# Patient Record
Sex: MALE | Race: WHITE | HISPANIC OR LATINO | Employment: OTHER | URBAN - METROPOLITAN AREA
[De-identification: names, ages, dates, MRNs, and addresses within clinical notes are randomized per-mention and may not be internally consistent; named-entity substitution may affect disease eponyms.]

---

## 2018-06-12 ENCOUNTER — OFFICE VISIT (OUTPATIENT)
Dept: UROLOGY | Facility: CLINIC | Age: 78
End: 2018-06-12
Payer: COMMERCIAL

## 2018-06-12 VITALS
HEART RATE: 55 BPM | DIASTOLIC BLOOD PRESSURE: 70 MMHG | TEMPERATURE: 97.8 F | RESPIRATION RATE: 18 BRPM | SYSTOLIC BLOOD PRESSURE: 114 MMHG

## 2018-06-12 DIAGNOSIS — R35.1 NOCTURIA: Primary | ICD-10-CM

## 2018-06-12 LAB
ALBUMIN UR-MCNC: NEGATIVE MG/DL
APPEARANCE UR: CLEAR
BILIRUB UR QL STRIP: NEGATIVE
COLOR UR AUTO: YELLOW
GLUCOSE UR STRIP-MCNC: NEGATIVE MG/DL
HGB UR QL STRIP: NEGATIVE
KETONES UR STRIP-MCNC: NEGATIVE MG/DL
LEUKOCYTE ESTERASE UR QL STRIP: NEGATIVE
NITRATE UR QL: NEGATIVE
PH UR STRIP: 5.5 PH (ref 5–7)
RBC #/AREA URNS AUTO: ABNORMAL /HPF
SOURCE: ABNORMAL
SP GR UR STRIP: 1.02 (ref 1–1.03)
URATE CRY #/AREA URNS HPF: ABNORMAL /HPF
UROBILINOGEN UR STRIP-ACNC: 0.2 EU/DL (ref 0.2–1)
WBC #/AREA URNS AUTO: ABNORMAL /HPF

## 2018-06-12 PROCEDURE — 51798 US URINE CAPACITY MEASURE: CPT | Performed by: UROLOGY

## 2018-06-12 PROCEDURE — 99203 OFFICE O/P NEW LOW 30 MIN: CPT | Mod: 25 | Performed by: UROLOGY

## 2018-06-12 PROCEDURE — 87086 URINE CULTURE/COLONY COUNT: CPT | Performed by: UROLOGY

## 2018-06-12 PROCEDURE — 81001 URINALYSIS AUTO W/SCOPE: CPT | Performed by: UROLOGY

## 2018-06-12 RX ORDER — ROSUVASTATIN CALCIUM 10 MG/1
10 TABLET, COATED ORAL AT BEDTIME
COMMUNITY
Start: 2018-05-31

## 2018-06-12 RX ORDER — LISINOPRIL 10 MG/1
10 TABLET ORAL DAILY
Status: ON HOLD | COMMUNITY
Start: 2018-05-31 | End: 2021-08-31

## 2018-06-12 RX ORDER — ASPIRIN 325 MG
325 TABLET ORAL DAILY
COMMUNITY
Start: 2010-11-17

## 2018-06-12 RX ORDER — OXYBUTYNIN CHLORIDE 5 MG/1
5 TABLET ORAL 3 TIMES DAILY
Qty: 30 TABLET | Refills: 3 | Status: SHIPPED | OUTPATIENT
Start: 2018-06-12 | End: 2019-03-01

## 2018-06-12 RX ORDER — SPIRONOLACTONE 25 MG
20 TABLET ORAL 2 TIMES DAILY
COMMUNITY
Start: 2014-01-17

## 2018-06-12 RX ORDER — FINASTERIDE 5 MG/1
5 TABLET, FILM COATED ORAL DAILY
COMMUNITY
Start: 2018-05-14

## 2018-06-12 RX ORDER — CHLORAL HYDRATE 500 MG
2 CAPSULE ORAL DAILY
COMMUNITY
Start: 2013-06-24 | End: 2020-08-28

## 2018-06-12 RX ORDER — TAMSULOSIN HYDROCHLORIDE 0.4 MG/1
0.4 CAPSULE ORAL DAILY
COMMUNITY
Start: 2018-05-31

## 2018-06-12 RX ORDER — VITAMIN E 268 MG
400 CAPSULE ORAL DAILY
COMMUNITY
Start: 2014-01-17 | End: 2020-08-28

## 2018-06-12 RX ORDER — AMLODIPINE BESYLATE 5 MG/1
5 TABLET ORAL DAILY
Status: ON HOLD | COMMUNITY
Start: 2018-05-31 | End: 2021-08-31

## 2018-06-12 RX ORDER — SILDENAFIL 100 MG/1
100 TABLET, FILM COATED ORAL PRN
COMMUNITY
Start: 2018-05-31 | End: 2020-08-28

## 2018-06-12 NOTE — NURSING NOTE
Chief Complaint   Patient presents with     Consult     Transfer Care, Nocturia, currently prescribed finasteride, tamsulosin, and oxybutynin     Elevated PSA     History of Elevated PSA with benign biopsy        Initial /70 (BP Location: Right arm, Patient Position: Chair, Cuff Size: Adult Regular)  Pulse 55  Temp 97.8  F (36.6  C) (Tympanic)  Resp 18 There is no height or weight on file to calculate BMI.  BP completed using cuff size: regular  Medications and allergies reviewed.      Cecy PANDEY, CMA

## 2018-06-12 NOTE — NURSING NOTE
Active order to obtain bladder scan? Yes   Name of ordering provider:  Claudia   Bladder scan preformed post void Yes.  Bladder scan reveled 20ML  Provider notified?  Yes    Cecy PANDEY CMA

## 2018-06-12 NOTE — MR AVS SNAPSHOT
After Visit Summary   6/12/2018    Roberto Carlos Arce    MRN: [id number]           Patient Information     Date Of Birth          1940        Visit Information        Provider Department      6/12/2018 1:00 PM SANA Taylor MD [facility]        Today's Diagnoses     Nocturia    -  1      Care Instructions    Per Physician's instructions            Follow-ups after your visit        Who to contact     If you have questions or need follow up information about today's clinic visit or your schedule please contact [facility] directly at [phone].  Normal or non-critical lab and imaging results will be communicated to you by MyChart, letter or phone within 4 business days after the clinic has received the results. If you do not hear from us within 7 days, please contact the clinic through MyChart or phone. If you have a critical or abnormal lab result, we will notify you by phone as soon as possible.  Submit refill requests through [employer] or call your pharmacy and they will forward the refill request to us. Please allow 3 business days for your refill to be completed.          Additional Information About Your Visit        Care EveryWhere ID     This is your Care EveryWhere ID. This could be used by other organizations to access your [facility] medical records  [id number]        Your Vitals Were     Pulse Temperature Respirations             55 97.8  F (36.6  C) (Tympanic) 18          Blood Pressure from Last 3 Encounters:   06/12/18 114/70   12/23/16 117/80    Weight from Last 3 Encounters:   No data found for Wt              We Performed the Following     MEASURE POST-VOID RESIDUAL URINE/BLADDER CAPACITY, US NON-IMAGING     UA with Microscopic     Urine Culture Aerobic Bacterial          Today's Medication Changes          These changes are accurate as of 6/12/18  3:11 PM.  If you have any questions, ask your nurse or doctor.               Start taking these  medicines.        Dose/Directions    oxybutynin 5 MG tablet   Commonly known as:  DITROPAN   Used for:  Nocturia   Started by:  SANA Taylor MD        Dose:  5 mg   Take 1 tablet (5 mg) by mouth 3 times daily   Quantity:  30 tablet   Refills:  3            Where to get your medicines      These medications were sent to [id number] - [place], [place] - [address]  [address] [address]     Phone:  [phone]     oxybutynin 5 MG tablet                Primary Care Provider Office Phone # Fax #    Amirah LauraKYLAH [phone]       [facility] [address] [place] [address]        Equal Access to Services     MIKAYLA MEDEROS : Hadii jose cosby hadasho Soomaali, waaxda luqadaha, qaybta kaalmada adeegyada, latisha león. So [facility] [phone].    ATENCIÓN: Si habla español, tiene a barrera disposición servicios gratuitos de asistencia lingüística. Llame al [phone].    We comply with applicable federal civil rights laws and [place] laws. We do not discriminate on the basis of race, color, national origin, age, disability, sex, sexual orientation, or gender identity.            Thank you!     Thank you for choosing [facility]  for your care. Our goal is always to provide you with excellent care. Hearing back from our patients is one way we can continue to improve our services. Please take a few minutes to complete the written survey that you may receive in the mail after your visit with us. Thank you!             Your Updated Medication List - Protect others around you: Learn how to safely use, store and throw away your medicines at www.disposemymeds.org.          This list is accurate as of 6/12/18  3:11 PM.  Always use your most recent med list.                   Brand Name Dispense Instructions for use Diagnosis    amLODIPine 5 MG tablet    NORVASC     Take 5 mg by mouth daily        aspirin 325 MG  tablet      Take 325 mg by mouth daily        finasteride 5 MG tablet    PROSCAR     Take 5 mg by mouth daily        fish oil-omega-3 fatty acids 1000 MG capsule      Take 2 capsules by mouth daily        lisinopril 10 MG tablet    PRINIVIL/ZESTRIL     Take 10 mg by mouth daily        Lutein 20 MG Caps      Take 20 mg by mouth daily        MULTIVITAMIN ADULT PO      Take 1 tablet by mouth daily        oxybutynin 5 MG tablet    DITROPAN    30 tablet    Take 1 tablet (5 mg) by mouth 3 times daily    Nocturia       rosuvastatin 10 MG tablet    CRESTOR     Take 10 mg by mouth At Bedtime        sildenafil 100 MG tablet    VIAGRA     100 mg by Oral or Feeding Tube route as needed        tamsulosin 0.4 MG capsule    FLOMAX     Take 0.4 mg by mouth daily        vitamin D3 1000 units Caps      Take 2,000 Units by mouth daily        vitamin E 400 UNIT capsule      Take 400 Units by mouth daily

## 2018-06-12 NOTE — PROGRESS NOTES
Appointment source: New Patient  Patient name: Roberto Carlos Arce  Urology Staff: Baljeet Taylor MD    Subjective: This is a 77 year old year old male with a history of mild symptoms of bladder outlet obstruction.    Objective:  Has been managed with finasteride and tamsulosin but still having some nocturia.    Possible childhood history of enuresis.    Sleep disturbance has not been evaluated for non urological issues.    Post void residual 20 cc    Examination:    Abdomen benign  External genitalia normal  Rectal normal  Prostate benign    Assessment:  Nocturia despite tamsulosin and finasteride.    Plan:  Will add oxybutynin to his voiding regimen as there may be a neurogenic component to his voiding dysfunction.    Total time 30 minutes, counseling 20 minutes discussing nocturia

## 2018-06-13 LAB
BACTERIA SPEC CULT: NO GROWTH
Lab: NORMAL
SPECIMEN SOURCE: NORMAL

## 2019-03-01 DIAGNOSIS — R35.1 NOCTURIA: ICD-10-CM

## 2019-03-01 RX ORDER — OXYBUTYNIN CHLORIDE 5 MG/1
5 TABLET ORAL 3 TIMES DAILY
Qty: 30 TABLET | Refills: 3 | Status: SHIPPED | OUTPATIENT
Start: 2019-03-01 | End: 2020-11-17

## 2019-03-01 NOTE — TELEPHONE ENCOUNTER
Reason for Call:  Medication or medication refill:    Do you use a [facility]?  Name of the pharmacy and phone number for the current request:  [facility] [phone]    Name of the medication requested: Oxybutynin    Other request:   LAST REFILL: 02/21/2019  LOV: 06/12/2018    Can we leave a detailed message on this number? Not Applicable    Phone number patient can be reached at: Home number on file [phone] (home)    Best Time: NA    Call taken on 3/1/2019 at 7:32 AM by Denise Behrendt

## 2020-08-28 ENCOUNTER — HOSPITAL ENCOUNTER (EMERGENCY)
Facility: CLINIC | Age: 80
Discharge: HOME | End: 2020-08-28
Attending: EMERGENCY MEDICINE | Admitting: EMERGENCY MEDICINE
Payer: COMMERCIAL

## 2020-08-28 ENCOUNTER — APPOINTMENT (OUTPATIENT)
Dept: CT IMAGING | Facility: CLINIC | Age: 80
End: 2020-08-28
Attending: EMERGENCY MEDICINE
Payer: COMMERCIAL

## 2020-08-28 ENCOUNTER — APPOINTMENT (OUTPATIENT)
Dept: MRI IMAGING | Facility: CLINIC | Age: 80
End: 2020-08-28
Attending: EMERGENCY MEDICINE
Payer: COMMERCIAL

## 2020-08-28 VITALS
SYSTOLIC BLOOD PRESSURE: 133 MMHG | BODY MASS INDEX: 24.41 KG/M2 | WEIGHT: 143 LBS | OXYGEN SATURATION: 99 % | TEMPERATURE: 96.9 F | HEIGHT: 64 IN | RESPIRATION RATE: 16 BRPM | HEART RATE: 54 BPM | DIASTOLIC BLOOD PRESSURE: 72 MMHG

## 2020-08-28 DIAGNOSIS — R47.1 DYSARTHRIA: ICD-10-CM

## 2020-08-28 DIAGNOSIS — G45.9 TIA (TRANSIENT ISCHEMIC ATTACK): ICD-10-CM

## 2020-08-28 LAB
ALBUMIN SERPL-MCNC: 4.1 G/DL (ref 3.4–5)
ALP SERPL-CCNC: 48 U/L (ref 40–150)
ALT SERPL W P-5'-P-CCNC: 30 U/L (ref 0–70)
ANION GAP SERPL CALCULATED.3IONS-SCNC: 2 MMOL/L (ref 3–14)
ANION GAP SERPL CALCULATED.3IONS-SCNC: 4 MMOL/L (ref 3–14)
APTT PPP: 30 SEC (ref 22–37)
AST SERPL W P-5'-P-CCNC: 20 U/L (ref 0–45)
BASOPHILS # BLD AUTO: 0.1 10E9/L (ref 0–0.2)
BASOPHILS NFR BLD AUTO: 0.6 %
BILIRUB SERPL-MCNC: 1 MG/DL (ref 0.2–1.3)
BUN SERPL-MCNC: 23 MG/DL (ref 7–30)
BUN SERPL-MCNC: 24 MG/DL (ref 7–30)
CALCIUM SERPL-MCNC: 9.3 MG/DL (ref 8.5–10.1)
CALCIUM SERPL-MCNC: 9.7 MG/DL (ref 8.5–10.1)
CHLORIDE SERPL-SCNC: 107 MMOL/L (ref 94–109)
CHLORIDE SERPL-SCNC: 107 MMOL/L (ref 94–109)
CO2 SERPL-SCNC: 29 MMOL/L (ref 20–32)
CO2 SERPL-SCNC: 29 MMOL/L (ref 20–32)
CREAT SERPL-MCNC: 0.96 MG/DL (ref 0.66–1.25)
CREAT SERPL-MCNC: 0.98 MG/DL (ref 0.66–1.25)
DIFFERENTIAL METHOD BLD: NORMAL
EOSINOPHIL # BLD AUTO: 0.2 10E9/L (ref 0–0.7)
EOSINOPHIL NFR BLD AUTO: 2.1 %
ERYTHROCYTE [DISTWIDTH] IN BLOOD BY AUTOMATED COUNT: 12.6 % (ref 10–15)
GFR SERPL CREATININE-BSD FRML MDRD: 72 ML/MIN/{1.73_M2}
GFR SERPL CREATININE-BSD FRML MDRD: 74 ML/MIN/{1.73_M2}
GLUCOSE BLDC GLUCOMTR-MCNC: 120 MG/DL (ref 70–99)
GLUCOSE SERPL-MCNC: 106 MG/DL (ref 70–99)
GLUCOSE SERPL-MCNC: 107 MG/DL (ref 70–99)
HCT VFR BLD AUTO: 45.3 % (ref 40–53)
HGB BLD-MCNC: 15 G/DL (ref 13.3–17.7)
IMM GRANULOCYTES # BLD: 0 10E9/L (ref 0–0.4)
IMM GRANULOCYTES NFR BLD: 0.5 %
INR PPP: 1.27 (ref 0.86–1.14)
LYMPHOCYTES # BLD AUTO: 1 10E9/L (ref 0.8–5.3)
LYMPHOCYTES NFR BLD AUTO: 12.4 %
MCH RBC QN AUTO: 31.3 PG (ref 26.5–33)
MCHC RBC AUTO-ENTMCNC: 33.1 G/DL (ref 31.5–36.5)
MCV RBC AUTO: 94 FL (ref 78–100)
MONOCYTES # BLD AUTO: 0.7 10E9/L (ref 0–1.3)
MONOCYTES NFR BLD AUTO: 8.3 %
NEUTROPHILS # BLD AUTO: 6.1 10E9/L (ref 1.6–8.3)
NEUTROPHILS NFR BLD AUTO: 76.1 %
NRBC # BLD AUTO: 0 10*3/UL
NRBC BLD AUTO-RTO: 0 /100
PLATELET # BLD AUTO: 171 10E9/L (ref 150–450)
POTASSIUM SERPL-SCNC: 4.6 MMOL/L (ref 3.4–5.3)
POTASSIUM SERPL-SCNC: 4.6 MMOL/L (ref 3.4–5.3)
PROT SERPL-MCNC: 7 G/DL (ref 6.8–8.8)
RBC # BLD AUTO: 4.8 10E12/L (ref 4.4–5.9)
SODIUM SERPL-SCNC: 138 MMOL/L (ref 133–144)
SODIUM SERPL-SCNC: 140 MMOL/L (ref 133–144)
TROPONIN I SERPL-MCNC: <0.015 UG/L (ref 0–0.04)
WBC # BLD AUTO: 8 10E9/L (ref 4–11)

## 2020-08-28 PROCEDURE — 25000125 ZZHC RX 250: Performed by: EMERGENCY MEDICINE

## 2020-08-28 PROCEDURE — 99285 EMERGENCY DEPT VISIT HI MDM: CPT | Mod: 25 | Performed by: EMERGENCY MEDICINE

## 2020-08-28 PROCEDURE — A9585 GADOBUTROL INJECTION: HCPCS | Performed by: EMERGENCY MEDICINE

## 2020-08-28 PROCEDURE — 80053 COMPREHEN METABOLIC PANEL: CPT | Performed by: EMERGENCY MEDICINE

## 2020-08-28 PROCEDURE — 84484 ASSAY OF TROPONIN QUANT: CPT | Performed by: EMERGENCY MEDICINE

## 2020-08-28 PROCEDURE — 85730 THROMBOPLASTIN TIME PARTIAL: CPT | Performed by: EMERGENCY MEDICINE

## 2020-08-28 PROCEDURE — 85610 PROTHROMBIN TIME: CPT | Performed by: EMERGENCY MEDICINE

## 2020-08-28 PROCEDURE — 85025 COMPLETE CBC W/AUTO DIFF WBC: CPT | Performed by: EMERGENCY MEDICINE

## 2020-08-28 PROCEDURE — 25800030 ZZH RX IP 258 OP 636: Performed by: EMERGENCY MEDICINE

## 2020-08-28 PROCEDURE — 80048 BASIC METABOLIC PNL TOTAL CA: CPT | Performed by: EMERGENCY MEDICINE

## 2020-08-28 PROCEDURE — 93010 ELECTROCARDIOGRAM REPORT: CPT | Mod: Z6 | Performed by: EMERGENCY MEDICINE

## 2020-08-28 PROCEDURE — 25000128 H RX IP 250 OP 636: Performed by: EMERGENCY MEDICINE

## 2020-08-28 PROCEDURE — 93005 ELECTROCARDIOGRAM TRACING: CPT | Performed by: EMERGENCY MEDICINE

## 2020-08-28 PROCEDURE — 96361 HYDRATE IV INFUSION ADD-ON: CPT | Performed by: EMERGENCY MEDICINE

## 2020-08-28 PROCEDURE — 96360 HYDRATION IV INFUSION INIT: CPT | Mod: 59 | Performed by: EMERGENCY MEDICINE

## 2020-08-28 PROCEDURE — 00000146 ZZHCL STATISTIC GLUCOSE BY METER IP

## 2020-08-28 PROCEDURE — 70496 CT ANGIOGRAPHY HEAD: CPT

## 2020-08-28 PROCEDURE — 25500064 ZZH RX 255 OP 636: Performed by: EMERGENCY MEDICINE

## 2020-08-28 PROCEDURE — 70450 CT HEAD/BRAIN W/O DYE: CPT

## 2020-08-28 PROCEDURE — 70553 MRI BRAIN STEM W/O & W/DYE: CPT

## 2020-08-28 RX ORDER — CHLORPROMAZINE HYDROCHLORIDE 25 MG/1
25 TABLET, FILM COATED ORAL 3 TIMES DAILY PRN
COMMUNITY
Start: 2016-08-02

## 2020-08-28 RX ORDER — IOPAMIDOL 755 MG/ML
70 INJECTION, SOLUTION INTRAVASCULAR ONCE
Status: COMPLETED | OUTPATIENT
Start: 2020-08-28 | End: 2020-08-28

## 2020-08-28 RX ORDER — GADOBUTROL 604.72 MG/ML
6 INJECTION INTRAVENOUS ONCE
Status: COMPLETED | OUTPATIENT
Start: 2020-08-28 | End: 2020-08-28

## 2020-08-28 RX ORDER — LEVOTHYROXINE SODIUM 88 UG/1
88 TABLET ORAL DAILY
COMMUNITY

## 2020-08-28 RX ADMIN — SODIUM CHLORIDE 100 ML: 9 INJECTION, SOLUTION INTRAVENOUS at 13:47

## 2020-08-28 RX ADMIN — GADOBUTROL 6 ML: 604.72 INJECTION INTRAVENOUS at 16:37

## 2020-08-28 RX ADMIN — SODIUM CHLORIDE 500 ML: 9 INJECTION, SOLUTION INTRAVENOUS at 13:28

## 2020-08-28 RX ADMIN — IOPAMIDOL 70 ML: 755 INJECTION, SOLUTION INTRAVENOUS at 13:48

## 2020-08-28 ASSESSMENT — ENCOUNTER SYMPTOMS
BACK PAIN: 0
HEADACHES: 0
SHORTNESS OF BREATH: 0
SPEECH DIFFICULTY: 1
ABDOMINAL PAIN: 0
DYSURIA: 0
NECK PAIN: 0
COUGH: 0
WEAKNESS: 0
LIGHT-HEADEDNESS: 0
PHOTOPHOBIA: 0
FACIAL ASYMMETRY: 0
CHILLS: 0
SORE THROAT: 0
NAUSEA: 0
PALPITATIONS: 0
FEVER: 0
VOMITING: 0
DIARRHEA: 0

## 2020-08-28 ASSESSMENT — VISUAL ACUITY: OU: NORMAL ACUITY

## 2020-08-28 ASSESSMENT — MIFFLIN-ST. JEOR: SCORE: 1259.64

## 2020-08-28 NOTE — ED NOTES
On Tuesday slurred speech came and go per wife. Has had a prior hemorrhagic event in 2016. Denies any headache, nausea, fever, aches or chills. Comes in today for slurred speech episodes continuing.

## 2020-08-28 NOTE — ED PROVIDER NOTES
History     Chief Complaint   Patient presents with     Slurred Speech     word find issues today, slur to speech past couple days,      HPI  Roberto Carlos Arce is a 80 year old male with history of hypertension, carotid stenosis, hyperlipidemia, hypothyroidism, BPH, intracranial hemorrhage (2016-right frontal intraparenchymal hemorrhage), who presents for evaluation of slurred speech.  Patient wife reports that his symptoms began 3 days ago and have been intermittent.  Symptoms worse in the morning and seemed to clear up by noon.  Symptoms seem to be worse today concerning wife about possible stroke and she insisted they come to emergency department for evaluation.  Patient does not have any other symptoms including headache, dizziness, loss of consciousness, chest pain, shortness of breath, difficulty swallowing, vision changes, or unsteady gait.  Had a prior intracranial hemorrhage in 2016.  Has not had similar symptoms before.  Is not a smoker, and does not drink alcohol.  Recently started the chlorpromazine as needed for hiccups.     Patient has medication list which includes chlorpromazine 25 mg as needed, Tamsulosin 4 mg daily, levothyroxine 88 daily, lovastatin 10 mg daily, lisinopril 10 mg daily, oxybutynin 5 mgmcg, finasteride 5 mg daily, amlodipine 5 mg daily, 325 mg aspirin daily    The patient's PMHx, Surgical Hx, Allergies, and Medications were all reviewed with the patient.    Allergies:  No Known Allergies    Problem List:    There are no active problems to display for this patient.       Past Medical History:    No past medical history on file.    Past Surgical History:    No past surgical history on file.    Family History:    No family history on file.    Social History:  Marital Status:   [2]  Social History     Tobacco Use     Smoking status: Never Smoker     Smokeless tobacco: Never Used   Substance Use Topics     Alcohol use: No     Drug use: No        Medications:    amLODIPine  "(NORVASC) 5 MG tablet  aspirin 325 MG tablet  Chelated Potassium 99 MG TABS  chlorproMAZINE (THORAZINE) 25 MG tablet  Cholecalciferol (VITAMIN D3) 1000 units CAPS  finasteride (PROSCAR) 5 MG tablet  levothyroxine (SYNTHROID/LEVOTHROID) 88 MCG tablet  lisinopril (PRINIVIL/ZESTRIL) 10 MG tablet  Lutein 20 MG CAPS  oxybutynin (DITROPAN) 5 MG tablet  rosuvastatin (CRESTOR) 10 MG tablet  tamsulosin (FLOMAX) 0.4 MG capsule          Review of Systems   Constitutional: Negative for chills and fever.   HENT: Negative for congestion and sore throat.    Eyes: Negative for photophobia and visual disturbance.   Respiratory: Negative for cough and shortness of breath.    Cardiovascular: Negative for chest pain and palpitations.   Gastrointestinal: Negative for abdominal pain, diarrhea, nausea and vomiting.   Genitourinary: Negative for dysuria.   Musculoskeletal: Negative for back pain and neck pain.   Skin: Negative for rash.   Allergic/Immunologic: Negative for immunocompromised state.   Neurological: Positive for speech difficulty. Negative for syncope, facial asymmetry, weakness, light-headedness and headaches.       Physical Exam   BP: 124/75  Pulse: 60  Temp: 96.9  F (36.1  C)  Resp: 16  Height: 162.6 cm (5' 4\")  Weight: 64.9 kg (143 lb)  SpO2: 98 %    Physical Exam  General: Awake, alert, and cooperative. No acute distress  Mental Status: Oriented x 3. Speech normal.  Head: Normocephalic, atraumatic, symmetric.   Eyes: Conjunctiva normal, no discharge, PERRL 2 mm. No neglect/hemianopsia.  Visual fields intact to confrontation.  No RAPD.  Ears, Nose, Throat: External ears and nares normal.  No oral exudates. Oral mucosa.  Neck: Supple. No adenopathy. Non-tender.   Cardiovascular: Regular rate. Regular rhythm. No murmurs. Peripheral pulses strong. Normal capillary refill. No LE edema.   Respiratory: Normal effort. No wheezes, rales, or rhonchi bilaterally.  Chest Wall: Equal rise.  Abdomen: No tenderess, soft, non-distended. " No rebound or guarding. No masses palpated  Musculoskeletal: No gross deformity. Warm and well perfused  Neurologic: Mental status: Alert, awake. Oriented to self, date, and place. Normal speech and language. GCS 15  Cranial Nerves: II-XII  intact  Motor: Follows commands x 4 extremities. Down going Babinski's. No Clonus.   Upper Extremities:   RUE: 5/5 shoulder abduction. 5/5 elbow flex/ext. 5/5 wrist flex/ext. 5/5 hand .   LUE: 5/5 shoulder abduction. 5/5 elbow flex/ext. 5/5 wrist flex/ext. 5/5 hand .   Lower Extremities:   RLE: 5/5 hip flexion. 5/5 knee flex/ext. 5/5 ankle plantar-/dorsiflexion. 5/5 EHL.   LLE: 5/5 hip flexion. 5/5 knee flex/ext. 5/5 ankle plantar-/dorsiflexion. 5/5 EHL   Sensory: Sensation intact to light touch in all 4 extremities   Coordination: Finger-nose finger intact. Heel-shin intact.  Rapid alternating hands intact.  Skin: Warm, dry, no pallor, no erythema, no jaundice or rash.  Psychiatric:  Appropriate affect. Behavior is normal.    ED Course     ED Course as of Aug 28 1841   Fri Aug 28, 2020   1332 Spoke with  Dr. Mendez with stroke neurology. Recommends admission and MR.        Procedures             EKG Interpretation:      Interpreted by Roberto Carlos Epps MD  Time reviewed: 1330  Symptoms at time of EKG: None   Rhythm: sinus bradycardia  Rate: 55  Axis: Left Axis Deviation  Ectopy: none  Conduction: normal  ST Segments/ T Waves: No acute ischemic changes. TWI III  Q Waves: none  Comparison to prior: No old EKG available    Clinical Impression: sinus bradycardia. TWI III               Critical Care time:  was 50 minutes for this patient excluding procedures.     The patient has stroke symptoms:         ED Stroke specific documentation           NIHSS PDF     Patient last known well time: 3 days ago  ED Provider first to bedside at: 1310  CT Results received at: N/A (no code stroke)    tPA:   Not given due to being well outside treatment window, NIHSS 0, and prior  intraparenchymal hemorrhage.      If treating with tPA: Ensure SBP<185 and DBP<105 prior to treatment with IV tPA.  Administering IV tPA after treatment with IV labetalol, hydralazine, or nicardipine is reasonable once BP control is established.    Endovascular Retrieval:  Not initiated due to absence of proximal vessel occlusion    National Institutes of Health Stroke Scale (Baseline)  Time Performed: 1310     Score    Level of consciousness: (0)   Alert, keenly responsive    LOC questions: (0)   Answers both questions correctly    LOC commands: (0)   Performs both tasks correctly    Best gaze: (0)   Normal    Visual: (0)   No visual loss    Facial palsy: (0)   Normal symmetrical movements    Motor arm (left): (0)   No drift    Motor arm (right): (0)   No drift    Motor leg (left): (0)   No drift    Motor leg (right): (0)   No drift    Limb ataxia: (0)   Absent    Sensory: (0)   Normal- no sensory loss    Best language: (0)   Normal- no aphasia    Dysarthria: (0)   Normal    Extinction and inattention: (0)   No abnormality        Total Score:  0        Stroke Mimics were considered (including migraine headache, seizure disorder, hypoglycemia (or hyperglycemia), head or spinal trauma, CNS infection, Toxin ingestion and shock state (e.g. sepsis) .                     Results for orders placed or performed during the hospital encounter of 08/28/20 (from the past 24 hour(s))   CBC with platelets differential   Result Value Ref Range    WBC 8.0 4.0 - 11.0 10e9/L    RBC Count 4.80 4.4 - 5.9 10e12/L    Hemoglobin 15.0 13.3 - 17.7 g/dL    Hematocrit 45.3 40.0 - 53.0 %    MCV 94 78 - 100 fl    MCH 31.3 26.5 - 33.0 pg    MCHC 33.1 31.5 - 36.5 g/dL    RDW 12.6 10.0 - 15.0 %    Platelet Count 171 150 - 450 10e9/L    Diff Method Automated Method     % Neutrophils 76.1 %    % Lymphocytes 12.4 %    % Monocytes 8.3 %    % Eosinophils 2.1 %    % Basophils 0.6 %    % Immature Granulocytes 0.5 %    Nucleated RBCs 0 0 /100    Absolute  Neutrophil 6.1 1.6 - 8.3 10e9/L    Absolute Lymphocytes 1.0 0.8 - 5.3 10e9/L    Absolute Monocytes 0.7 0.0 - 1.3 10e9/L    Absolute Eosinophils 0.2 0.0 - 0.7 10e9/L    Absolute Basophils 0.1 0.0 - 0.2 10e9/L    Abs Immature Granulocytes 0.0 0 - 0.4 10e9/L    Absolute Nucleated RBC 0.0    Comprehensive metabolic panel   Result Value Ref Range    Sodium 140 133 - 144 mmol/L    Potassium 4.6 3.4 - 5.3 mmol/L    Chloride 107 94 - 109 mmol/L    Carbon Dioxide 29 20 - 32 mmol/L    Anion Gap 4 3 - 14 mmol/L    Glucose 107 (H) 70 - 99 mg/dL    Urea Nitrogen 23 7 - 30 mg/dL    Creatinine 0.96 0.66 - 1.25 mg/dL    GFR Estimate 74 >60 mL/min/[1.73_m2]    GFR Estimate If Black 85 >60 mL/min/[1.73_m2]    Calcium 9.7 8.5 - 10.1 mg/dL    Bilirubin Total 1.0 0.2 - 1.3 mg/dL    Albumin 4.1 3.4 - 5.0 g/dL    Protein Total 7.0 6.8 - 8.8 g/dL    Alkaline Phosphatase 48 40 - 150 U/L    ALT 30 0 - 70 U/L    AST 20 0 - 45 U/L   Basic metabolic panel   Result Value Ref Range    Sodium 138 133 - 144 mmol/L    Potassium 4.6 3.4 - 5.3 mmol/L    Chloride 107 94 - 109 mmol/L    Carbon Dioxide 29 20 - 32 mmol/L    Anion Gap 2 (L) 3 - 14 mmol/L    Glucose 106 (H) 70 - 99 mg/dL    Urea Nitrogen 24 7 - 30 mg/dL    Creatinine 0.98 0.66 - 1.25 mg/dL    GFR Estimate 72 >60 mL/min/[1.73_m2]    GFR Estimate If Black 83 >60 mL/min/[1.73_m2]    Calcium 9.3 8.5 - 10.1 mg/dL   INR   Result Value Ref Range    INR 1.27 (H) 0.86 - 1.14   Partial thromboplastin time   Result Value Ref Range    PTT 30 22 - 37 sec   Troponin I   Result Value Ref Range    Troponin I ES <0.015 0.000 - 0.045 ug/L   Glucose by meter   Result Value Ref Range    Glucose 120 (H) 70 - 99 mg/dL   CT Head w/o Contrast    Narrative    CT SCAN OF THE HEAD WITHOUT CONTRAST   8/28/2020 2:05 PM     HISTORY: Intermittent slurring of speech x three days. History of  intraparenchymal bleed in 2016.    TECHNIQUE:  Axial images of the head and coronal reformations without  IV contrast material.  Radiation dose for this scan was reduced using  automated exposure control, adjustment of the mA and/or kV according  to patient size, or iterative reconstruction technique.    COMPARISON: CT head 12/23/2016.    FINDINGS: Previously demonstrated areas of encephalomalacia in the  anterior paramedian aspect of the right frontal lobe and involving the  lateral right temporal lobe (centered in the posterior aspect of the  right middle temporal gyrus) are unchanged. There is mild to moderate  generalized brain parenchymal volume loss. Mild to moderate patchy  areas of hypoattenuation in the periventricular and deep cerebral  white matter, compatible with probable chronic small vessel ischemic  disease. Ventricles are within normal limits in size and  configuration. Mild flattening of the pituitary gland along the floor  of the sella, unchanged. No intracranial hemorrhage, mass lesion or  mass effect. Mild atherosclerotic disease involving the carotid  siphons.    Right lens replacement. Orbits otherwise appear normal. The visualized  paranasal sinuses are free of significant disease. The mastoid and  middle ear cavities are clear. The bony calvarium and bones of the  skull base appear intact. Partially visualized degenerative changes in  the upper cervical spine.      Impression    IMPRESSION:  1. No CT findings of acute intracranial abnormality.  2. Unchanged areas of encephalomalacia in the anterior paramedian  right frontal lobe and lateral right temporal lobe.  3. Global parenchymal volume loss and presumed chronic small vessel  ischemic disease, as described.    YAA BARBOSA MD   CTA Head Neck with Contrast    Narrative    CT ANGIOGRAM OF THE HEAD AND NECK WITH CONTRAST  8/28/2020 2:13 PM     HISTORY: Intermittent slurring of speech x 3 days. History of  intraparenchymal bleed in 2016.     TECHNIQUE:  CT angiography with an injection of 70 mL Isovue 370 IV  with scans through the head and neck. Images were  transferred to a  separate 3-D workstation where multiplanar reformations and 3-D images  were created. Estimates of carotid stenoses are made relative to the  distal internal carotid artery diameters except as noted. Radiation  dose for this scan was reduced using automated exposure control,  adjustment of the mA and/or kV according to patient size, or iterative  reconstruction technique.     COMPARISON: CTA head of same date. CT angiogram head and neck  12/23/2016    CT HEAD FINDINGS: No contrast enhancing lesions. Cerebral vasculature  appears grossly symmetric. Please see separate report from noncontrast  head CT of same date for details regarding structural brain findings,  including previously demonstrated areas of encephalomalacia in the  anterior right frontal lobe and lateral right temporal lobe.    CT ANGIOGRAM HEAD FINDINGS:  The major intracranial arteries including  the proximal branches of the anterior cerebral, middle cerebral, and  posterior cerebral arteries appear patent without vascular cutoff. No  aneurysm identified. No significant stenosis. Venous circulation is  unremarkable.     CT ANGIOGRAM NECK FINDINGS:   Mild atherosclerosis involving the aortic arch without significant  stenosis of the origins of the great vessels.     Right carotid artery: The right common and internal carotid arteries  are patent. Minimal atherosclerosis of the carotid bifurcation without  stenosis.     Left carotid artery: The left common and internal carotid arteries are  patent. Mild atherosclerosis of the left carotid bifurcation without  stenosis.    Vertebral arteries: Mild focal stenosis of the V2 segment of the right  vertebral artery at the level of the C3 vertebral body related to  external mass effect from adjacent uncovertebral and facet osteophytes  (series 6 image 263) otherwise, no significant stenosis of the  cervical vertebral arteries.    Other findings: Mild heterogeneity of the thyroid gland  with  subcentimeter hypodensities. Multilevel degenerative disc disease and  uncovertebral and facet arthropathy of the cervical spine. Mild  retrolisthesis of C3 on C4 and anterolisthesis of C4 on C5 and C5 on  C6 and C7 on T1 on a degenerative basis.       Impression    IMPRESSION:  1. No definite flow-limiting stenosis or occlusion of the major  craniocervical arterial vasculature.  2. Mild focal stenosis of the V2 segment of the right vertebral artery  related to external mass effect from osteophytes at the level of C3.  3. Minimal atherosclerosis of the carotid bifurcations, without  associated stenosis.    YAA BARBOSA MD   MR Brain w/o & w Contrast    Narrative    MRI BRAIN WITHOUT AND WITH CONTRAST  8/28/2020 5:00 PM    HISTORY:  three days of intermittent dysarthria. hx of prior ICH.     TECHNIQUE:  Multiplanar, multisequence MRI of the brain without and  with 6ml gadavist    COMPARISON: CT dated 12/23/2016    FINDINGS:     Intracranial contents: The brain parenchyma, ventricles and  subarachnoid spaces appear normal. Areas of chronic encephalomalacia  are again seen in the right temporal lobe and medial aspect of the  right frontal lobe. These were present on the CT scan from 2016. There  is susceptibility hypointensity associated with the lesion in the  medial aspect of the right frontal lobe and the lesion in the right  temporal lobe. There is also susceptibility hypointensity in the sulci  over both convexities. The pattern is consistent with superficial  siderosis.  No evidence for any acute hemorrhage. No mass effect   There are no gadolinium enhancing lesions. The arteries at the base of  the brain and the dural venous sinuses appear patent.     Sella:  No significant abnormality accounting for technique.     Orbit: No significant abnormality accounting for technique.      Sinus/mastoids: No significant paranasal sinus mucosal disease. No  significant middle ear or mastoid effusion.    Bones/soft  tissues: No aggressive osseous lesion involving the  calvarium, skull base, or visualized upper cervical spine.       Impression    IMPRESSION:    1. No acute pathology. No acute hemorrhage. No acute infarcts are  identified.  2. Susceptibility hypointensity is seen in the sulci over both  convexities. Pattern is consistent with hemosiderin deposition an is  consistent with superficial siderosis. This is probably due to a  previous subarachnoid hemorrhage.  3. Chronic areas of encephalomalacia in the right temporal lobe and  medial aspect of the right frontal lobe.      YOSEF ROCHA MD       Medications   0.9% sodium chloride BOLUS (0 mLs Intravenous Stopped 8/28/20 1554)   iopamidol (ISOVUE-370) solution 70 mL (70 mLs Intravenous Given 8/28/20 1348)   sodium chloride 0.9 % bag 500mL for CT scan flush use (100 mLs Intravenous Given 8/28/20 1347)   gadobutrol (GADAVIST) injection 6 mL (6 mLs Intravenous Given 8/28/20 1637)       Assessments & Plan (with Medical Decision Making)   80 year old male with past medical history of intraparenchymal hemorrhage (2016), hypertension, hyperlipidemia, carotid stenosis, hypothyroidism, with 3 days of intermittent slurring of speech.  initial neurologic exam reassuring and NIH stroke scale of 0.  on arrival to Emergency Department, vital signs were blood pressure 124/75, temperature 96.9, pulse 60, respiratory rate 16, SPO2 98% on room air. Blood glucose 120 (point of care).     Given that his symptoms were ongoing for greater than 3 days and return neurologic exam a stroke code was not called.  CT CTA of head and neck ordered and stroke neurology consulted. I discussed the case with Dr. Mendez with stroke neurology who recommend admission and MR of brain. Because of his prior hemorrhage, I will get CT of head initially. ECG with sinus bradycardia and no signs of acute ischemia.  Troponin below level of detection.  CBC normal, CMP grossly normal, glucose 107.     CT of head without  any acute intracranial pathology.  Encephalomalacia in anterior right frontal lobe and right temporal lobe consistent with prior hemorrhage.  CTA with no large vessel occlusion.  I discussed results with stroke neurology and continued plan for MR imaging of brain.  No acute pathology or hemorrhage on MR.  Hypointensities in sulci  over both convexities consistent with hemosiderin deposits from prior intracranial hemorrhage. This could be causing seizure activity but this would unlikely only present as dysarthria. Patient remained asymptomatic while in Emergency Department.     Cased discussed with Dr. Mchugh from [facility] [place] who recommends continuing aspirin and outpatient neurology follow-up. Says it would be very unlikely for TIA to present as isolated dysarthria on multiple occasions.  All results discussed with patient and he was given option for observation versus discharge.  He would like to go home and feels comfortable with this plan.  Follow-up with neurology as outpatient, referral placed in epic.    I have reviewed the nursing notes.         New Prescriptions    No medications on file       Final diagnoses:   Dysarthria   TIA (transient ischemic attack) - possible     Roberto Carlos Epps MD    8/28/2020   [facility] EMERGENCY DEPARTMENT    Disclaimer: This note consists of words and symbols derived from keyboarding and dictation using voice recognition software.  As a result, there may be errors that have gone undetected.  Please consider this when interpreting information found in this note.             Roberto Carlos Epps MD  08/28/20 9950

## 2020-08-28 NOTE — ED AVS SNAPSHOT
[facility] Emergency Department  [address] [address]  Phone:  [phone]  Fax:  [phone]                                    Roberto Carlos Arce   MRN: [id number]    Department:  [facility] Emergency Department   Date of Visit:  8/28/2020           After Visit Summary Signature Page    I have received my discharge instructions, and my questions have been answered. I have discussed any challenges I see with this plan with the nurse or doctor.    ..........................................................................................................................................  Patient/Patient Representative Signature      ..........................................................................................................................................  Patient Representative Print Name and Relationship to Patient    ..................................................               ................................................  Date                                   Time    ..........................................................................................................................................  Reviewed by Signature/Title    ...................................................              ..............................................  Date                                               Time          22EPIC Rev 08/18

## 2020-08-28 NOTE — DISCHARGE INSTRUCTIONS
Your evaluation emergency department was reassuring including CT and MRI imaging of your brain.  Continue to take aspirin.  Follow-up with neurology as an outpatient.  Referral is been placed.  If you develop any new or concerning symptoms, please return to emergency department immediately for further evaluation and treatment.  Particularly, any weakness, change in vision, difficulty swallowing, headache, or sensory changes.

## 2020-09-14 ENCOUNTER — MEDICAL CORRESPONDENCE (OUTPATIENT)
Dept: HEALTH INFORMATION MANAGEMENT | Facility: CLINIC | Age: 80
End: 2020-09-14

## 2020-10-01 ENCOUNTER — TRANSCRIBE ORDERS (OUTPATIENT)
Dept: OTHER | Age: 80
End: 2020-10-01

## 2020-10-01 DIAGNOSIS — R32 UNSPECIFIED URINARY INCONTINENCE: ICD-10-CM

## 2020-10-01 DIAGNOSIS — N40.1 HYPERTROPHY OF PROSTATE WITH URINARY OBSTRUCTION: Primary | ICD-10-CM

## 2020-10-01 DIAGNOSIS — N13.8 HYPERTROPHY OF PROSTATE WITH URINARY OBSTRUCTION: Primary | ICD-10-CM

## 2020-11-17 ENCOUNTER — OFFICE VISIT (OUTPATIENT)
Dept: UROLOGY | Facility: CLINIC | Age: 80
End: 2020-11-17
Payer: COMMERCIAL

## 2020-11-17 VITALS
HEART RATE: 55 BPM | SYSTOLIC BLOOD PRESSURE: 125 MMHG | RESPIRATION RATE: 12 BRPM | DIASTOLIC BLOOD PRESSURE: 74 MMHG | WEIGHT: 136 LBS | BODY MASS INDEX: 24.1 KG/M2 | HEIGHT: 63 IN | TEMPERATURE: 96.4 F

## 2020-11-17 DIAGNOSIS — N13.8 HYPERTROPHY OF PROSTATE WITH URINARY OBSTRUCTION: ICD-10-CM

## 2020-11-17 DIAGNOSIS — N40.1 HYPERTROPHY OF PROSTATE WITH URINARY OBSTRUCTION: ICD-10-CM

## 2020-11-17 DIAGNOSIS — R35.1 NOCTURIA: ICD-10-CM

## 2020-11-17 LAB
ALBUMIN UR-MCNC: NEGATIVE MG/DL
APPEARANCE UR: CLEAR
BILIRUB UR QL STRIP: NEGATIVE
COLOR UR AUTO: YELLOW
GLUCOSE UR STRIP-MCNC: NEGATIVE MG/DL
HGB UR QL STRIP: NEGATIVE
KETONES UR STRIP-MCNC: NEGATIVE MG/DL
LEUKOCYTE ESTERASE UR QL STRIP: NEGATIVE
NITRATE UR QL: NEGATIVE
PH UR STRIP: 6.5 PH (ref 5–7)
SOURCE: NORMAL
SP GR UR STRIP: 1.01 (ref 1–1.03)
UROBILINOGEN UR STRIP-ACNC: 0.2 EU/DL (ref 0.2–1)

## 2020-11-17 PROCEDURE — 51798 US URINE CAPACITY MEASURE: CPT | Performed by: UROLOGY

## 2020-11-17 PROCEDURE — 81003 URINALYSIS AUTO W/O SCOPE: CPT | Performed by: UROLOGY

## 2020-11-17 PROCEDURE — 99213 OFFICE O/P EST LOW 20 MIN: CPT | Mod: 25 | Performed by: UROLOGY

## 2020-11-17 PROCEDURE — 87086 URINE CULTURE/COLONY COUNT: CPT | Performed by: UROLOGY

## 2020-11-17 RX ORDER — OXYBUTYNIN CHLORIDE 5 MG/1
5 TABLET ORAL 3 TIMES DAILY
Qty: 90 TABLET | Refills: 3 | Status: SHIPPED | OUTPATIENT
Start: 2020-11-17

## 2020-11-17 ASSESSMENT — MIFFLIN-ST. JEOR: SCORE: 1212.02

## 2020-11-17 NOTE — PROGRESS NOTES
Appointment source: Established Patient  Patient name: Roberto Carlos Arce  Urology Staff: Baljeet Taylor MD    Subjective: This is a 80 year old year old male returning for follow up of nocturia.    He still reports that he is getting up about 5 times a night.    Two of those times is to take care of his dogs needs.    Objective: Postvoid residual is 41 mL    Prostate is benign to palpation.    Assessment: Continued nocturia despite 5 mg oxybutynin at bedtime.    Plan: We will increase to 10 mg of oxybutynin at bedtime and I will call him for follow-up in 2 weeks.    Total time 15 minutes, counseling 15 minutes discussing management of nocturia

## 2020-11-17 NOTE — NURSING NOTE
"Initial /74 (BP Location: Left arm, Patient Position: Sitting, Cuff Size: Adult Regular)   Pulse 55   Temp 96.4  F (35.8  C) (Tympanic)   Resp 12   Ht 1.6 m (5' 3\")   Wt 61.7 kg (136 lb)   BMI 24.09 kg/m   Estimated body mass index is 24.09 kg/m  as calculated from the following:    Height as of this encounter: 1.6 m (5' 3\").    Weight as of this encounter: 61.7 kg (136 lb).   PVR=41    Carrie Grant  [facility] RN  "

## 2020-11-18 LAB
BACTERIA SPEC CULT: NORMAL
Lab: NORMAL
SPECIMEN SOURCE: NORMAL

## 2020-11-25 ENCOUNTER — VIRTUAL VISIT (OUTPATIENT)
Dept: UROLOGY | Facility: CLINIC | Age: 80
End: 2020-11-25
Payer: COMMERCIAL

## 2020-11-25 DIAGNOSIS — R35.0 URINARY FREQUENCY: Primary | ICD-10-CM

## 2020-11-25 PROCEDURE — 99212 OFFICE O/P EST SF 10 MIN: CPT | Mod: 95 | Performed by: UROLOGY

## 2020-11-25 NOTE — PROGRESS NOTES
Telephone visit    Mr. Arce has noted improvement and better control of his urinary frequency when taking 2 of the 5 mg immediate release oxybutynin at midday.  He has noticed some xerostomia.    I suggested he continue with the 2 tablets midday (5 mg immediate release) of oxybutynin and consider adding a third tablet at bedtime if he wants to better control his nocturia.    Currently the nocturia is down to about twice a night I suggested he hold off on adding the third pill (which may worsen his xerostomia) for a couple weeks to assess the impact of this change in his dose from 1 to 2 pills of oxybutynin daily.    I will call him back in a couple weeks to see how he is doing.    Total time 5 minutes

## 2021-07-23 ENCOUNTER — HOSPITAL ENCOUNTER (EMERGENCY)
Facility: CLINIC | Age: 81
Discharge: LEFT BEFORE DISCHARGE | End: 2021-07-23
Payer: COMMERCIAL

## 2021-07-23 VITALS
SYSTOLIC BLOOD PRESSURE: 127 MMHG | RESPIRATION RATE: 18 BRPM | BODY MASS INDEX: 28.88 KG/M2 | WEIGHT: 163 LBS | DIASTOLIC BLOOD PRESSURE: 83 MMHG | TEMPERATURE: 97.7 F | HEART RATE: 71 BPM | OXYGEN SATURATION: 99 % | HEIGHT: 63 IN

## 2021-07-23 ASSESSMENT — MIFFLIN-ST. JEOR: SCORE: 1329.49

## 2021-08-08 ENCOUNTER — HOSPITAL ENCOUNTER (EMERGENCY)
Facility: CLINIC | Age: 81
Discharge: HOME | End: 2021-08-08
Attending: EMERGENCY MEDICINE | Admitting: EMERGENCY MEDICINE
Payer: COMMERCIAL

## 2021-08-08 ENCOUNTER — APPOINTMENT (OUTPATIENT)
Dept: CT IMAGING | Facility: CLINIC | Age: 81
End: 2021-08-08
Attending: EMERGENCY MEDICINE
Payer: COMMERCIAL

## 2021-08-08 VITALS
RESPIRATION RATE: 12 BRPM | BODY MASS INDEX: 25.51 KG/M2 | DIASTOLIC BLOOD PRESSURE: 74 MMHG | WEIGHT: 144 LBS | HEART RATE: 87 BPM | SYSTOLIC BLOOD PRESSURE: 132 MMHG | OXYGEN SATURATION: 97 % | TEMPERATURE: 97.1 F

## 2021-08-08 DIAGNOSIS — R06.6 CHRONIC HICCOUGHS: ICD-10-CM

## 2021-08-08 DIAGNOSIS — R41.3 MEMORY IMPAIRMENT OF GRADUAL ONSET: ICD-10-CM

## 2021-08-08 PROBLEM — G45.9 TIA (TRANSIENT ISCHEMIC ATTACK): Status: ACTIVE | Noted: 2020-09-14

## 2021-08-08 PROBLEM — M85.89 OSTEOPENIA OF MULTIPLE SITES: Status: ACTIVE | Noted: 2019-12-06

## 2021-08-08 LAB
ALBUMIN SERPL-MCNC: 3.9 G/DL (ref 3.4–5)
ALBUMIN UR-MCNC: 30 MG/DL
ALP SERPL-CCNC: 48 U/L (ref 40–150)
ALT SERPL W P-5'-P-CCNC: 34 U/L (ref 0–70)
ANION GAP SERPL CALCULATED.3IONS-SCNC: 7 MMOL/L (ref 3–14)
APPEARANCE UR: CLEAR
AST SERPL W P-5'-P-CCNC: 39 U/L (ref 0–45)
BASOPHILS # BLD AUTO: 0 10E3/UL (ref 0–0.2)
BASOPHILS NFR BLD AUTO: 0 %
BILIRUB SERPL-MCNC: 0.9 MG/DL (ref 0.2–1.3)
BILIRUB UR QL STRIP: NEGATIVE
BUN SERPL-MCNC: 24 MG/DL (ref 7–30)
CALCIUM SERPL-MCNC: 8.7 MG/DL (ref 8.5–10.1)
CHLORIDE BLD-SCNC: 102 MMOL/L (ref 94–109)
CO2 SERPL-SCNC: 28 MMOL/L (ref 20–32)
COLOR UR AUTO: YELLOW
CREAT SERPL-MCNC: 1.14 MG/DL (ref 0.66–1.25)
EOSINOPHIL # BLD AUTO: 0 10E3/UL (ref 0–0.7)
EOSINOPHIL NFR BLD AUTO: 0 %
ERYTHROCYTE [DISTWIDTH] IN BLOOD BY AUTOMATED COUNT: 12.8 % (ref 10–15)
GFR SERPL CREATININE-BSD FRML MDRD: 59 ML/MIN/1.73M2
GLUCOSE BLD-MCNC: 111 MG/DL (ref 70–99)
GLUCOSE BLDC GLUCOMTR-MCNC: 125 MG/DL (ref 70–99)
GLUCOSE UR STRIP-MCNC: NEGATIVE MG/DL
HCT VFR BLD AUTO: 44 % (ref 40–53)
HGB BLD-MCNC: 14.9 G/DL (ref 13.3–17.7)
HGB UR QL STRIP: NEGATIVE
HOLD SPECIMEN: NORMAL
IMM GRANULOCYTES # BLD: 0 10E3/UL
IMM GRANULOCYTES NFR BLD: 0 %
KETONES UR STRIP-MCNC: 20 MG/DL
LACTATE SERPL-SCNC: 0.8 MMOL/L (ref 0.7–2)
LEUKOCYTE ESTERASE UR QL STRIP: NEGATIVE
LYMPHOCYTES # BLD AUTO: 0.6 10E3/UL (ref 0.8–5.3)
LYMPHOCYTES NFR BLD AUTO: 9 %
MCH RBC QN AUTO: 31.4 PG (ref 26.5–33)
MCHC RBC AUTO-ENTMCNC: 33.9 G/DL (ref 31.5–36.5)
MCV RBC AUTO: 93 FL (ref 78–100)
MONOCYTES # BLD AUTO: 0.8 10E3/UL (ref 0–1.3)
MONOCYTES NFR BLD AUTO: 11 %
MUCOUS THREADS #/AREA URNS LPF: PRESENT /LPF
NEUTROPHILS # BLD AUTO: 5.4 10E3/UL (ref 1.6–8.3)
NEUTROPHILS NFR BLD AUTO: 80 %
NITRATE UR QL: NEGATIVE
NRBC # BLD AUTO: 0 10E3/UL
NRBC BLD AUTO-RTO: 0 /100
PH UR STRIP: 5 [PH] (ref 5–7)
PLATELET # BLD AUTO: 165 10E3/UL (ref 150–450)
POTASSIUM BLD-SCNC: 4.2 MMOL/L (ref 3.4–5.3)
PROT SERPL-MCNC: 6.9 G/DL (ref 6.8–8.8)
RBC # BLD AUTO: 4.74 10E6/UL (ref 4.4–5.9)
RBC URINE: 2 /HPF
SODIUM SERPL-SCNC: 137 MMOL/L (ref 133–144)
SP GR UR STRIP: 1.02 (ref 1–1.03)
TROPONIN I SERPL-MCNC: <0.015 UG/L (ref 0–0.04)
TSH SERPL DL<=0.005 MIU/L-ACNC: 0.86 MU/L (ref 0.4–4)
UROBILINOGEN UR STRIP-MCNC: NORMAL MG/DL
WBC # BLD AUTO: 6.8 10E3/UL (ref 4–11)
WBC URINE: 2 /HPF

## 2021-08-08 PROCEDURE — 81001 URINALYSIS AUTO W/SCOPE: CPT | Performed by: EMERGENCY MEDICINE

## 2021-08-08 PROCEDURE — 84443 ASSAY THYROID STIM HORMONE: CPT | Performed by: EMERGENCY MEDICINE

## 2021-08-08 PROCEDURE — 93010 ELECTROCARDIOGRAM REPORT: CPT | Performed by: EMERGENCY MEDICINE

## 2021-08-08 PROCEDURE — 70450 CT HEAD/BRAIN W/O DYE: CPT

## 2021-08-08 PROCEDURE — 250N000013 HC RX MED GY IP 250 OP 250 PS 637: Performed by: EMERGENCY MEDICINE

## 2021-08-08 PROCEDURE — 93005 ELECTROCARDIOGRAM TRACING: CPT | Performed by: EMERGENCY MEDICINE

## 2021-08-08 PROCEDURE — 85025 COMPLETE CBC W/AUTO DIFF WBC: CPT | Performed by: EMERGENCY MEDICINE

## 2021-08-08 PROCEDURE — 84484 ASSAY OF TROPONIN QUANT: CPT | Performed by: EMERGENCY MEDICINE

## 2021-08-08 PROCEDURE — 36415 COLL VENOUS BLD VENIPUNCTURE: CPT | Performed by: EMERGENCY MEDICINE

## 2021-08-08 PROCEDURE — 99285 EMERGENCY DEPT VISIT HI MDM: CPT | Mod: 25 | Performed by: EMERGENCY MEDICINE

## 2021-08-08 PROCEDURE — 99284 EMERGENCY DEPT VISIT MOD MDM: CPT | Mod: 25 | Performed by: EMERGENCY MEDICINE

## 2021-08-08 PROCEDURE — 80053 COMPREHEN METABOLIC PANEL: CPT | Performed by: EMERGENCY MEDICINE

## 2021-08-08 PROCEDURE — 83605 ASSAY OF LACTIC ACID: CPT | Performed by: EMERGENCY MEDICINE

## 2021-08-08 RX ORDER — CHLORPROMAZINE HYDROCHLORIDE 25 MG/1
25 TABLET, FILM COATED ORAL 3 TIMES DAILY
Qty: 90 TABLET | Refills: 0 | Status: ON HOLD | OUTPATIENT
Start: 2021-08-08 | End: 2021-08-31

## 2021-08-08 RX ORDER — CHLORPROMAZINE HYDROCHLORIDE 25 MG/1
25 TABLET, FILM COATED ORAL ONCE
Status: COMPLETED | OUTPATIENT
Start: 2021-08-08 | End: 2021-08-08

## 2021-08-08 RX ADMIN — CHLORPROMAZINE HYDROCHLORIDE 25 MG: 25 TABLET, FILM COATED ORAL at 10:58

## 2021-08-08 NOTE — DISCHARGE INSTRUCTIONS
[facility] evaluation emergency by was reassuring.  No acute cause of his symptoms was identified.  CT scan with no new changes and blood work also reassuring.    I suspect his symptoms may be related to underlying dementia.  Strongly recommend primary care follow-up this coming week and may need additional evaluation which can be completed as an outpatient.    A prescription for chlorpromazine was sent to  [facility] pharmacy.  He may take this up to 3 times a day for hiccups.    If your symptoms worsen or you develop new or concerning symptoms, please return to the Emergency Department for further evaluation and treatment.

## 2021-08-08 NOTE — ED NOTES
Daughter reports memory for about a month. Increased weakness over the last week. Over the last 2 days the pt has had the hiccups, more weakness and wondering.

## 2021-08-08 NOTE — ED PROVIDER NOTES
"  History     Chief Complaint   Patient presents with     Altered Mental Status     confusion, memory loss. symptoms onset one week ago.     HPI  Roberto Carlos Arce is a 81 year old male with history significant for TIA, intracranial hemorrhage (2016 - right frontal hemorrhage), urinary retention, acute encephalopathy, carotid stenosis, who with confusion and memory difficulties.  Patient is awake and alert but does seem to have some underlying dementia which makes me question the veracity of historical information.  His daughter is present at bedside and very supportive.  She reports that he has had worsening memory issues over the past several months.  No reported abrupt change in this.  He seems to be increasingly wandering around the house in the evening and appears to be more confused to her over the past 2 weeks.  Has a decrease in his appetite and his head intractable hiccups returned 2 days ago.  Patient lives with his wife as well as his daughter who was at bedside.  Reports taking no medications however chart review shows that he was on Flomax, oxybutynin, lisinopril, rosuvastatin, levothyroxine, finasteride, chlorpromazine, amlodipine, and aspirin.  Daughter is checking with family member to physically look for medication bottles at home.    Patient denies any specific complaints particular abdominal pain, chest pain, shortness of breath, nausea, vomiting, diarrhea.  He says were looking for answers.  I asked him \"what is the question\".  He did not respond for 10 to 15 seconds.  We then moved on with our conversation.  Daughter reports that he tripped and fell about a month ago but did not hit his head.  His wife sent a handwritten note stating that she is concerned that the symptoms are similar to 2016 when he had a head bleed.  She is worried that this may return and the note is requesting a CT scan of his head.    The patient's PMHx, Surgical Hx, Allergies, and Medications were all reviewed with the " patient.    Allergies:  No Known Allergies    Problem List:    Patient Active Problem List    Diagnosis Date Noted     TIA (transient ischemic attack) 09/14/2020     Priority: Medium     Osteopenia of multiple sites 12/06/2019     Priority: Medium     Formatting of this note might be different from the original.  11-18-19 dexa scan.       HLD (hyperlipidemia)      Priority: Medium     Hypothyroid      Priority: Medium     Encephalopathy 08/07/2016     Priority: Medium     SURENDRA (acute kidney injury) (H) 08/07/2016     Priority: Medium     UTI (lower urinary tract infection)      Priority: Medium     Replacement Utility updated for latest IMO load         Hiccups      Priority: Medium     Urinary retention      Priority: Medium     Intraparenchymal hemorrhage of brain (H)      Priority: Medium     Intracranial bleed (H) 07/11/2016     Priority: Medium     Acute encephalopathy 07/11/2016     Priority: Medium     Benign essential HTN 07/11/2016     Priority: Medium     Prediabetes 07/28/2015     Priority: Medium     Carotid artery plaque 01/21/2014     Priority: Medium     Formatting of this note might be different from the original.  Ultrasound 1-21-14 left less than 50%. Right clear.       Benign prostatic hyperplasia with urinary retention 06/01/2011     Priority: Medium     Formatting of this note is different from the original.  Urologist:  None currently  Previous Ineffective Meds:  Current/Effective Meds:  Flomax  PSA TOTAL (SCREEN)        (ng/mL)   Date Value   11/30/2011 3.94          Past Medical History:    No past medical history on file.    Past Surgical History:    Past Surgical History:   Procedure Laterality Date     HERNIA REPAIR       PICC  7/11/2016            Family History:    Family History   Problem Relation Age of Onset     Hypertension Mother      Cerebrovascular Disease Mother      Coronary Artery Disease Father      Hypertension Father      Cerebrovascular Disease Brother        Social  History:  Marital Status:   [2]  Social History     Tobacco Use     Smoking status: Never Smoker     Smokeless tobacco: Never Used   Substance Use Topics     Alcohol use: No     Drug use: No        Medications:    chlorproMAZINE (THORAZINE) 25 MG tablet  amLODIPine (NORVASC) 5 MG tablet  aspirin 325 MG tablet  Chelated Potassium 99 MG TABS  chlorproMAZINE (THORAZINE) 25 MG tablet  Cholecalciferol (VITAMIN D3) 1000 units CAPS  finasteride (PROSCAR) 5 MG tablet  levothyroxine (SYNTHROID/LEVOTHROID) 88 MCG tablet  lisinopril (PRINIVIL/ZESTRIL) 10 MG tablet  Lutein 20 MG CAPS  oxybutynin (DITROPAN) 5 MG tablet  rosuvastatin (CRESTOR) 10 MG tablet  tamsulosin (FLOMAX) 0.4 MG capsule          Review of Systems   Unable to perform ROS: Dementia       Physical Exam   BP: (!) 151/70  Pulse: 75  Temp: 97.1  F (36.2  C)  Resp: 20  Weight: 65.3 kg (144 lb)  SpO2: 96 %    Physical Exam  GEN: Awake, alert and cooperative.  Pleasantly confused.   HENT: MMM. External ears and nose normal bilaterally.  Atraumatic  EYES: EOM intact. Conjunctiva clear. No discharge.  No RAPD.  No nystagmus.  NECK: Symmetric, freely mobile.  Nontender.  CV : Regular rate and rhythm.  Extremities warm and well perfused.  Capillary refill less than 2 seconds.  PULM: Normal effort. No wheezes, rales, or rhonchi bilaterally.  ABD: Soft, non-tender, non-distended. No rebound or guarding.   NEURO: Mental status: Alert, awake. Oriented to self, date, and place. Normal speech and language. GCS 14 (mild confusion)  Cranial Nerves: II-XII grossly intact  Motor: Follows commands x 4 extremities   Upper Extremities:   RUE: 5/5 shoulder abduction. 5/5 elbow flex/ext. 5/5 wrist flex/ext. 5/5 hand .   LUE: 5/5 shoulder abduction. 5/5 elbow flex/ext. 5/5 wrist flex/ext. 5/5 hand .    Lower Extremities:   RLE: 5/5 hip flexion. 5/5 knee flex/ext. 5/5 ankle plantar-/dorsiflexion. 5/5 EHL.   LLE: 5/5 hip flexion. 5/5 knee flex/ext. 5/5 ankle  plantar-/dorsiflexion. 5/5 EHL   Sensory: Sensation intact in all 4 extremities   Coordination: Finger-nose finger intact. Heel-shin intact. Negative Romberg.  EXT: No gross deformity.  No pitting pedal or pretibial edema.  INT: Warm. No diaphoresis. Normal color.        ED Course        Procedures          ECG: Interpreted by myself.  Sinus rhythm with rate of 75 bpm.  Leftward axis.  Delayed R wave progression.  T wave inversion lead III.  These changes were noted on prior ECG from 8/27 2020.  ECG was sinus bradycardia at that time.  PVCs.    Critical Care time:  none               Results for orders placed or performed during the hospital encounter of 08/08/21 (from the past 24 hour(s))   Glucose by meter   Result Value Ref Range    GLUCOSE BY METER POCT 125 (H) 70 - 99 mg/dL   [place] Draw    Narrative    The following orders were created for panel order [place] Draw.  Procedure                               Abnormality         Status                     ---------                               -----------         ------                     Extra Blue Top Tube[[id number]]                              Final result               Extra Red Top Tube[[id number]]                               Final result               Extra Green Top (Lithium...[[id number]]                      Final result               Extra Green Top (Lithium...[[id number]]                      Final result               Extra Purple Top Tube[[id number]]                            Final result                 Please view results for these tests on the individual orders.   Extra Blue Top Tube   Result Value Ref Range    Hold Specimen JIC    Extra Red Top Tube   Result Value Ref Range    Hold Specimen JIC    Extra Green Top (Lithium Heparin) Tube   Result Value Ref Range    Hold Specimen JIC    Extra Green Top (Lithium Heparin) Tube   Result Value Ref Range    Hold Specimen JIC    Extra Purple Top Tube   Result Value Ref Range    Hold Specimen JIC    CBC  with platelets differential    Narrative    The following orders were created for panel order CBC with platelets differential.  Procedure                               Abnormality         Status                     ---------                               -----------         ------                     CBC with platelets and d...[[id number]]  Abnormal            Final result                 Please view results for these tests on the individual orders.   Comprehensive metabolic panel   Result Value Ref Range    Sodium 137 133 - 144 mmol/L    Potassium 4.2 3.4 - 5.3 mmol/L    Chloride 102 94 - 109 mmol/L    Carbon Dioxide (CO2) 28 20 - 32 mmol/L    Anion Gap 7 3 - 14 mmol/L    Urea Nitrogen 24 7 - 30 mg/dL    Creatinine 1.14 0.66 - 1.25 mg/dL    Calcium 8.7 8.5 - 10.1 mg/dL    Glucose 111 (H) 70 - 99 mg/dL    Alkaline Phosphatase 48 40 - 150 U/L    AST 39 0 - 45 U/L    ALT 34 0 - 70 U/L    Protein Total 6.9 6.8 - 8.8 g/dL    Albumin 3.9 3.4 - 5.0 g/dL    Bilirubin Total 0.9 0.2 - 1.3 mg/dL    GFR Estimate 59 (L) >60 mL/min/1.73m2   Troponin I   Result Value Ref Range    Troponin I <0.015 0.000 - 0.045 ug/L   TSH with free T4 reflex   Result Value Ref Range    TSH 0.86 0.40 - 4.00 mU/L   CBC with platelets and differential   Result Value Ref Range    WBC Count 6.8 4.0 - 11.0 10e3/uL    RBC Count 4.74 4.40 - 5.90 10e6/uL    Hemoglobin 14.9 13.3 - 17.7 g/dL    Hematocrit 44.0 40.0 - 53.0 %    MCV 93 78 - 100 fL    MCH 31.4 26.5 - 33.0 pg    MCHC 33.9 31.5 - 36.5 g/dL    RDW 12.8 10.0 - 15.0 %    Platelet Count 165 150 - 450 10e3/uL    % Neutrophils 80 %    % Lymphocytes 9 %    % Monocytes 11 %    % Eosinophils 0 %    % Basophils 0 %    % Immature Granulocytes 0 %    NRBCs per 100 WBC 0 <1 /100    Absolute Neutrophils 5.4 1.6 - 8.3 10e3/uL    Absolute Lymphocytes 0.6 (L) 0.8 - 5.3 10e3/uL    Absolute Monocytes 0.8 0.0 - 1.3 10e3/uL    Absolute Eosinophils 0.0 0.0 - 0.7 10e3/uL    Absolute Basophils 0.0 0.0 - 0.2 10e3/uL     Absolute Immature Granulocytes 0.0 <=0.0 10e3/uL    Absolute NRBCs 0.0 10e3/uL   Lactic acid whole blood   Result Value Ref Range    Lactic Acid 0.8 0.7 - 2.0 mmol/L   Head CT w/o contrast    Narrative    CT SCAN OF THE HEAD WITHOUT CONTRAST August 8, 2021 11:16 AM     HISTORY: Mental status change, unknown cause. Prior right frontal  intracranial hemorrhage. Signs of increasing dementia.    TECHNIQUE: Axial images of the head and coronal reformations without  IV contrast material. Radiation dose for this scan was reduced using  automated exposure control, adjustment of the mA and/or kV according  to patient size, or iterative reconstruction technique.    COMPARISON: 8/28/2020.    FINDINGS:   Intracranial contents: No acute process intracranially. Stable  appearance to areas of chronic ischemic changes within  encephalomalacia paramedian right frontal lobe in the right anterior  several artery distribution/right A2 and A3 distribution, as well as  at the right temporal occipital junction. No hyperdense hemorrhage. No  mass or mass effect. No midline shift. Underlying mild chronic  ischemic changes periventricular deep white matter both cerebral  hemispheres. Stable and satisfactory ventricular caliber from prior  study concordant with the degree of parenchymal volume loss  particularly bifrontal level. There is some ex vacuo dilation of the  frontal horn of the right lateral ventricle noted as before. No  extra-axial hemorrhage/blood products. Satisfactory position of the  cerebellar tonsils. Sella shows no acute abnormality.    Visualized orbits/sinuses/mastoids: No acute orbital process. Mild  memory thickening in the ethmoid air cells.    Osseous structures/soft tissues: No acute fracture of the calvarium or  skull base. Moderate degenerative changes of both TMJs. No significant  swelling in the facial or scalp tissues.       Impression    IMPRESSION:   1. No acute process.  2. Chronic appearing changes  intracranially including right cerebral  hemisphere are stable from 8/28/2020.  3. No evidence for acute/evolving infarction or hemorrhage is noted  intracranially.      YUDITH DÍAZ MD         SYSTEM ID:  [id number]   UA with Microscopic reflex to Culture    Specimen: Urine, Midstream   Result Value Ref Range    Color Urine Yellow Colorless, Straw, Light Yellow, Yellow    Appearance Urine Clear Clear    Glucose Urine Negative Negative mg/dL    Bilirubin Urine Negative Negative    Ketones Urine 20  (A) Negative mg/dL    Specific Gravity Urine 1.023 1.003 - 1.035    Blood Urine Negative Negative    pH Urine 5.0 5.0 - 7.0    Protein Albumin Urine 30  (A) Negative mg/dL    Urobilinogen Urine Normal Normal, 2.0 mg/dL    Nitrite Urine Negative Negative    Leukocyte Esterase Urine Negative Negative    Mucus Urine Present (A) None Seen /LPF    RBC Urine 2 <=2 /HPF    WBC Urine 2 <=5 /HPF    Narrative    Urine Culture not indicated       Medications   chlorproMAZINE (THORAZINE) tablet 25 mg (25 mg Oral Given 8/8/21 1058)       Assessments & Plan (with Medical Decision Making)   81 year old male with past medical history significant for TIA?, intracranial hemorrhage (2016 - right frontal hemorrhage), urinary retention, acute encephalopathy, carotid stenosis, with several months of worsening memory issues, increased wandering at home and confusion over past 2 weeks and recurrence of hiccups over past 2 days.  Patient is limited in his ability to provide meaningful historical information.  It sounds like this is an insidious process and I suspect worsening of underlying dementia.  Neurologic exam reassuring and no focal deficits appreciated.  ECG unchanged no evidence of acute ischemia.  Cardiac troponin below level of detection.  Very low suspicion for ACS.  Lungs are clear, no cough or hypoxia or fever.  Very low suspicion for pneumonia.  Lactate was normal.  TSH within normal limits.  CBC without leukocytosis or anemia.   CMP grossly normal.  Urinalysis with ketones which is consistent with his decreased appetite and no evidence of acute infection.  Patient has had intractable hiccups for the past 2 days and this may be affecting his ability to eat and/or sleep.  This could be exacerbating his underlying symptoms. Vital signs reassuring.  No hypoxia or hypotension or other obvious cause of acute encephalopathy.  Chart review shows that he was on chlorpromazine previously, 25 mg 3 times a day for hiccups.  He was given 25 mg tablet in the emergency department with temporary resolution of his hiccups.  They did momentarily return prior to discharge.  Prescription sent for chlorpromazine to preferred pharmacy.    CT scan of head without any acute process.  Chronic appearing changes endocrine including right cerebral hemispheres which are stable compared to imaging on 8/28/2020.  No evidence for acute or evolving infarction suspected.    All results discussed with patient as well as her daughter.  She would prefer to take her father home and feels that they are able to care for him at this point and it is safe.  She lives in the home with him and her mother.  Her and her sisters have been talking about their father's health and possible need for further cares.  Strongly encourage close primary care follow-up in May benefit from neuropsychiatric testing if that has not been done.  Daughter was able to get a medication list as she previously said that he was not taking her medications.  Current list is tamsulosin 0.4 mg/day, finasteride 5 mg/day, lisinopril 10 mg/day, oxybutynin 5 mg/day, rosuvastatin 10 mg at bedtime, amlodipine 5 mg/day, levothyroxine 88 mcg/day.          I have reviewed the nursing notes.         Discharge Medication List as of 8/8/2021 12:55 PM      START taking these medications    Details   !! chlorproMAZINE (THORAZINE) 25 MG tablet Take 1 tablet (25 mg) by mouth 3 times daily, Disp-90 tablet, R-0, E-Prescribe        !! - Potential duplicate medications found. Please discuss with provider.          Final diagnoses:   Memory impairment of gradual onset   Chronic hiccoughs     Roberto Carlos Epps MD    8/8/2021   [facility] EMERGENCY DEPT    Disclaimer: This note consists of words and symbols derived from keyboarding and dictation using voice recognition software.  As a result, there may be errors that have gone undetected.  Please consider this when interpreting information found in this note.             Roberto Carlos Epps MD  08/08/21 7142

## 2021-08-28 ENCOUNTER — TRANSFERRED RECORDS (OUTPATIENT)
Dept: HEALTH INFORMATION MANAGEMENT | Facility: CLINIC | Age: 81
End: 2021-08-28

## 2021-08-28 ENCOUNTER — HOSPITAL ENCOUNTER (INPATIENT)
Facility: CLINIC | Age: 81
LOS: 4 days | Discharge: HOSPICE | DRG: 194 | End: 2021-09-01
Attending: INTERNAL MEDICINE | Admitting: INTERNAL MEDICINE
Payer: COMMERCIAL

## 2021-08-28 DIAGNOSIS — N40.1 BENIGN PROSTATIC HYPERPLASIA WITH URINARY RETENTION: ICD-10-CM

## 2021-08-28 DIAGNOSIS — N39.0 LOWER URINARY TRACT INFECTIOUS DISEASE: ICD-10-CM

## 2021-08-28 DIAGNOSIS — R33.8 BENIGN PROSTATIC HYPERPLASIA WITH URINARY RETENTION: ICD-10-CM

## 2021-08-28 DIAGNOSIS — Z86.16 HISTORY OF 2019 NOVEL CORONAVIRUS DISEASE (COVID-19): ICD-10-CM

## 2021-08-28 DIAGNOSIS — G45.9 TIA (TRANSIENT ISCHEMIC ATTACK): Primary | ICD-10-CM

## 2021-08-28 DIAGNOSIS — Z86.59 HISTORY OF DEMENTIA: ICD-10-CM

## 2021-08-28 DIAGNOSIS — G93.40 ENCEPHALOPATHY: ICD-10-CM

## 2021-08-28 DIAGNOSIS — R33.9 URINARY RETENTION: ICD-10-CM

## 2021-08-28 DIAGNOSIS — I62.9 INTRACRANIAL BLEED (H): ICD-10-CM

## 2021-08-28 DIAGNOSIS — J18.9 HCAP (HEALTHCARE-ASSOCIATED PNEUMONIA): ICD-10-CM

## 2021-08-28 PROCEDURE — 120N000001 HC R&B MED SURG/OB

## 2021-08-28 NOTE — LETTER
Transition Communication Hand-off for Care Transitions to Next Level of Care Provider    Name: Roberto Carlos Arce  : 1940  MRN #: [id number]  Primary Care Provider: Amirah Laura     Primary Clinic: [place] [id number] [facility] [place] [address]     Reason for Hospitalization:  HCAP (healthcare-associated pneumonia) [J18.9]  Admit Date/Time: 2021  1:30 AM  Discharge Date: 2021   Payor Source: Payor: [employer] / Plan: [employer] MEDICARE ADVANTAGE / Product Type: Medicare /          Reason for Communication Hand-off Referral: See below     Discharge Plan: Discharge from [facility] on 2021 to home w/ enrollment w/ [facility] this evening.           Discharge Needs Assessment:  Needs      Most Recent Value   Equipment Currently Used at Home  walker, standard, cane, straight, grab bar, tub/shower, grab bar, toilet, tub bench   # of Referrals Placed by CTS  External Care Coordination            Follow-up plan:  No future appointments.    Any outstanding tests or procedures:        Referrals     Future Labs/Procedures    Adult Neurology Referral     Comments:    Please be aware that coverage of these services is subject to the terms and limitations of your health insurance plan.  Call member services at your health plan with any benefit or coverage questions.  Please call to schedule your appointment      Adult Urology Referral     Comments:    Please be aware that coverage of these services is subject to the terms and limitations of your health insurance plan.  Call member services at your health plan with any benefit or coverage questions.  Please call to schedule your appointment      Hospice Referral     Process Instructions:    **Order classes of: [facility], [facility] and [facility] will route to the Home Care and Hospice Referral Pool.  Home Care or Hospice will then contact the patient to schedule their appointment.**      Comments:    Hospice eligibility  overview: prognosis of 6 months or less, end stage disease, patient goals are comfort only, patient is not seeking curative treatment.    If you do not hear from Hospice, or you would like to call to schedule, please call the referring place of service that your provider has referred you to.  ______________________________________________________________________    PLEASE Schedule Hospice consult for 24 - 48 hours.  Please call if there is need for a variance to this timeline.    No current outpatient medications on file.    Patient Active Problem List:     Intracranial bleed (H)     Acute encephalopathy     Benign essential HTN     Intraparenchymal hemorrhage of brain (H)     UTI (lower urinary tract infection)     Hiccups     Urinary retention     Encephalopathy     SURENDRA (acute kidney injury) (H)     HLD (hyperlipidemia)     Hypothyroid     Benign prostatic hyperplasia with urinary retention     Carotid artery plaque     Osteopenia of multiple sites     Prediabetes     TIA (transient ischemic attack)     HCAP (healthcare-associated pneumonia)     History of 2019 novel coronavirus disease (COVID-19)    This patient is under my care, and I have initiated the establishment of the plan of care. This patient will be followed by a physician who will periodically review the plan of care.    Physician/Provider to provide follow up care: Amirah Laura    Please be aware that coverage of these services is subject to the terms and limitations of your health insurance plan.  Call member services at your health plan with any benefit or coverage questions.  Other - Use Comments  [facility]  Please call to schedule your appointment      Home Care Nursing Referral     Comments:    RN, PT, HHA to begin 24 - 48 hours after discharge.    PLEASE EVALUATE AND TREAT (Evaluation timeline is 24 - 48 hrs. Please call if there is need for a variance to this timeline).    Current Outpatient Medications:  amLODIPine (NORVASC) 5 MG  tablet, Take 1 tablet (5 mg) by mouth daily, Disp: , Rfl:   cefdinir (OMNICEF) 300 MG capsule, Take 1 capsule (300 mg) by mouth every 12 hours for 8 days, Disp: 16 capsule, Rfl: 0  lisinopril (ZESTRIL) 10 MG tablet, Take 1 tablet (10 mg) by mouth daily, Disp: , Rfl:   rivaroxaban ANTICOAGULANT (XARELTO ANTICOAGULANT) 10 MG TABS tablet, Take 1 tablet (10 mg) by mouth daily (with dinner), Disp: 7 tablet, Rfl: 0      Patient Active Problem List:     Intracranial bleed (H)     Acute encephalopathy     Benign essential HTN     Intraparenchymal hemorrhage of brain (H)     UTI (lower urinary tract infection)     Hiccups     Urinary retention     Encephalopathy     SURENDRA (acute kidney injury) (H)     HLD (hyperlipidemia)     Hypothyroid     Benign prostatic hyperplasia with urinary retention     Carotid artery plaque     Osteopenia of multiple sites     Prediabetes     TIA (transient ischemic attack)     HCAP (healthcare-associated pneumonia)     History of 2019 novel coronavirus disease (COVID-19)      Documentation of Face to Face and Certification for Home Health Services    I certify that patient, Roberto Carlos Arce is under my care and that I, or a Nurse Practitioner or Physician's Assistant working with me, had a face-to-face encounter that meets the physician face-to-face encounter requirements with this patient on: 8/31/2021.    This encounter with the patient was in whole, or in part, for the following medical condition, which is the primary reason for Home Health Care: COVID, Health Care Acquired Pneumonia.    I certify that, based on my findings, the following services are medically necessary Home Health Services: Nursing and Physical Therapy    My clinical findings support the need for the above services because: Nurse is needed: To assess patient after changes in medications or other medical regimen.. and Physical Therapy Services are needed to assess and treat the following functional impairments: Weakness,  COVID 19 diagnosis on 8/10/21. .    Further, I certify that my clinical findings support that this patient is homebound (i.e. absences from home require considerable and taxing effort and are for medical reasons or [employer]ond[employer]ational services or infrequently or of short duration when for other reasons) because: Requires assistance of another person or specialized equipment to access medical services because patient: Is unable to operate assistive equipment on their own. and Requires supervision of another for safe transfer..    Based on the above findings, I certify that this patient is confined to the home and needs intermittent skilled nursing care, physical therapy and/or speech therapy.  The patient is under my care, and I have initiated the establishment of the plan of care.  This patient will be followed by a physician who will periodically review the plan of care.    Physician/Provider to provide follow up care: Amirah Laura certified Physician at time of discharge: Dr Caballero    Please be aware that coverage of these services is subject to the terms and limitations of your health insurance plan.  Call member services at your health plan with any benefit or coverage questions.  Please call to schedule your appointment                  Manuela Mart    AVS/Discharge Summary is the source of truth; this is a helpful guide for improved communication of patient story

## 2021-08-29 ENCOUNTER — APPOINTMENT (OUTPATIENT)
Dept: GENERAL RADIOLOGY | Facility: CLINIC | Age: 81
DRG: 194 | End: 2021-08-29
Attending: INTERNAL MEDICINE
Payer: COMMERCIAL

## 2021-08-29 PROBLEM — J18.9 HCAP (HEALTHCARE-ASSOCIATED PNEUMONIA): Status: ACTIVE | Noted: 2021-08-29

## 2021-08-29 PROBLEM — Z86.16 HISTORY OF 2019 NOVEL CORONAVIRUS DISEASE (COVID-19): Status: ACTIVE | Noted: 2021-08-29

## 2021-08-29 PROBLEM — J12.82 PNEUMONIA DUE TO 2019 NOVEL CORONAVIRUS: Status: ACTIVE | Noted: 2021-08-29

## 2021-08-29 PROBLEM — U07.1 PNEUMONIA DUE TO 2019 NOVEL CORONAVIRUS: Status: ACTIVE | Noted: 2021-08-29

## 2021-08-29 LAB
ABO/RH(D): NORMAL
ALBUMIN SERPL-MCNC: 2.1 G/DL (ref 3.4–5)
ALBUMIN UR-MCNC: 100 MG/DL
ALP SERPL-CCNC: 50 U/L (ref 40–150)
ALT SERPL W P-5'-P-CCNC: 47 U/L (ref 0–70)
AMMONIA PLAS-SCNC: 21 UMOL/L (ref 10–50)
ANION GAP SERPL CALCULATED.3IONS-SCNC: 2 MMOL/L (ref 3–14)
APPEARANCE UR: ABNORMAL
APTT PPP: 43 SECONDS (ref 22–38)
AST SERPL W P-5'-P-CCNC: 19 U/L (ref 0–45)
BACTERIA #/AREA URNS HPF: ABNORMAL /HPF
BASE EXCESS BLDA CALC-SCNC: 0.1 MMOL/L (ref -9–1.8)
BASOPHILS # BLD AUTO: 0 10E3/UL (ref 0–0.2)
BASOPHILS NFR BLD AUTO: 0 %
BILIRUB SERPL-MCNC: 1.4 MG/DL (ref 0.2–1.3)
BILIRUB UR QL STRIP: ABNORMAL
BUN SERPL-MCNC: 22 MG/DL (ref 7–30)
CALCIUM SERPL-MCNC: 8.6 MG/DL (ref 8.5–10.1)
CHLORIDE BLD-SCNC: 109 MMOL/L (ref 94–109)
CO2 SERPL-SCNC: 26 MMOL/L (ref 20–32)
COLOR UR AUTO: ABNORMAL
CREAT SERPL-MCNC: 0.81 MG/DL (ref 0.66–1.25)
CRP SERPL-MCNC: 57.1 MG/L (ref 0–8)
D DIMER PPP FEU-MCNC: 0.38 UG/ML FEU (ref 0–0.5)
EOSINOPHIL # BLD AUTO: 0.1 10E3/UL (ref 0–0.7)
EOSINOPHIL NFR BLD AUTO: 1 %
ERYTHROCYTE [DISTWIDTH] IN BLOOD BY AUTOMATED COUNT: 13.3 % (ref 10–15)
FIBRINOGEN PPP-MCNC: 618 MG/DL (ref 170–490)
GFR SERPL CREATININE-BSD FRML MDRD: 82 ML/MIN/1.73M2
GLUCOSE BLD-MCNC: 119 MG/DL (ref 70–99)
GLUCOSE UR STRIP-MCNC: NEGATIVE MG/DL
HCO3 BLD-SCNC: 24 MMOL/L (ref 21–28)
HCT VFR BLD AUTO: 35.2 % (ref 40–53)
HGB BLD-MCNC: 12.1 G/DL (ref 13.3–17.7)
HGB UR QL STRIP: ABNORMAL
HOLD SPECIMEN: NORMAL
IMM GRANULOCYTES # BLD: 0.1 10E3/UL
IMM GRANULOCYTES NFR BLD: 1 %
INR PPP: 1.43 (ref 0.85–1.15)
KETONES UR STRIP-MCNC: ABNORMAL MG/DL
L PNEUMO1 AG UR QL IA: NEGATIVE
LDH SERPL L TO P-CCNC: 166 U/L (ref 85–227)
LEUKOCYTE ESTERASE UR QL STRIP: NEGATIVE
LYMPHOCYTES # BLD AUTO: 0.5 10E3/UL (ref 0.8–5.3)
LYMPHOCYTES NFR BLD AUTO: 4 %
MAGNESIUM SERPL-MCNC: 2 MG/DL (ref 1.6–2.3)
MCH RBC QN AUTO: 30.9 PG (ref 26.5–33)
MCHC RBC AUTO-ENTMCNC: 34.4 G/DL (ref 31.5–36.5)
MCV RBC AUTO: 90 FL (ref 78–100)
MONOCYTES # BLD AUTO: 0.7 10E3/UL (ref 0–1.3)
MONOCYTES NFR BLD AUTO: 7 %
MUCOUS THREADS #/AREA URNS LPF: PRESENT /LPF
NEUTROPHILS # BLD AUTO: 9.4 10E3/UL (ref 1.6–8.3)
NEUTROPHILS NFR BLD AUTO: 87 %
NITRATE UR QL: NEGATIVE
NRBC # BLD AUTO: 0 10E3/UL
NRBC BLD AUTO-RTO: 0 /100
O2/TOTAL GAS SETTING VFR VENT: 1 %
PCO2 BLD: 36 MM HG (ref 35–45)
PH BLD: 7.43 [PH] (ref 7.35–7.45)
PH UR STRIP: 5 [PH] (ref 5–7)
PLATELET # BLD AUTO: 185 10E3/UL (ref 150–450)
PO2 BLD: 87 MM HG (ref 80–105)
POTASSIUM BLD-SCNC: 4 MMOL/L (ref 3.4–5.3)
PROCALCITONIN SERPL-MCNC: <0.05 NG/ML
PROT SERPL-MCNC: 5 G/DL (ref 6.8–8.8)
RBC # BLD AUTO: 3.91 10E6/UL (ref 4.4–5.9)
RBC URINE: >182 /HPF
SARS-COV-2 RNA RESP QL NAA+PROBE: POSITIVE
SODIUM SERPL-SCNC: 137 MMOL/L (ref 133–144)
SP GR UR STRIP: 1.02 (ref 1–1.03)
SPECIMEN EXPIRATION DATE: NORMAL
TROPONIN I SERPL-MCNC: <0.015 UG/L (ref 0–0.04)
UROBILINOGEN UR STRIP-MCNC: NORMAL MG/DL
WBC # BLD AUTO: 10.8 10E3/UL (ref 4–11)
WBC URINE: 0 /HPF

## 2021-08-29 PROCEDURE — 120N000001 HC R&B MED SURG/OB

## 2021-08-29 PROCEDURE — 82140 ASSAY OF AMMONIA: CPT | Performed by: INTERNAL MEDICINE

## 2021-08-29 PROCEDURE — 83735 ASSAY OF MAGNESIUM: CPT | Performed by: INTERNAL MEDICINE

## 2021-08-29 PROCEDURE — 86900 BLOOD TYPING SEROLOGIC ABO: CPT | Performed by: INTERNAL MEDICINE

## 2021-08-29 PROCEDURE — 87086 URINE CULTURE/COLONY COUNT: CPT | Performed by: NURSE PRACTITIONER

## 2021-08-29 PROCEDURE — 86140 C-REACTIVE PROTEIN: CPT | Performed by: INTERNAL MEDICINE

## 2021-08-29 PROCEDURE — 87635 SARS-COV-2 COVID-19 AMP PRB: CPT | Performed by: INTERNAL MEDICINE

## 2021-08-29 PROCEDURE — 250N000011 HC RX IP 250 OP 636: Performed by: INTERNAL MEDICINE

## 2021-08-29 PROCEDURE — 99207 PR DOWN CODE DUE TO INITIAL EXAM: CPT | Performed by: INTERNAL MEDICINE

## 2021-08-29 PROCEDURE — 87641 MR-STAPH DNA AMP PROBE: CPT | Performed by: NURSE PRACTITIONER

## 2021-08-29 PROCEDURE — 86769 SARS-COV-2 COVID-19 ANTIBODY: CPT | Performed by: NURSE PRACTITIONER

## 2021-08-29 PROCEDURE — 82803 BLOOD GASES ANY COMBINATION: CPT | Performed by: INTERNAL MEDICINE

## 2021-08-29 PROCEDURE — 87640 STAPH A DNA AMP PROBE: CPT | Performed by: NURSE PRACTITIONER

## 2021-08-29 PROCEDURE — 85384 FIBRINOGEN ACTIVITY: CPT | Performed by: INTERNAL MEDICINE

## 2021-08-29 PROCEDURE — 84484 ASSAY OF TROPONIN QUANT: CPT | Performed by: INTERNAL MEDICINE

## 2021-08-29 PROCEDURE — 36415 COLL VENOUS BLD VENIPUNCTURE: CPT | Performed by: INTERNAL MEDICINE

## 2021-08-29 PROCEDURE — 87899 AGENT NOS ASSAY W/OPTIC: CPT | Performed by: NURSE PRACTITIONER

## 2021-08-29 PROCEDURE — 85730 THROMBOPLASTIN TIME PARTIAL: CPT | Performed by: INTERNAL MEDICINE

## 2021-08-29 PROCEDURE — 250N000013 HC RX MED GY IP 250 OP 250 PS 637: Performed by: INTERNAL MEDICINE

## 2021-08-29 PROCEDURE — 84145 PROCALCITONIN (PCT): CPT | Performed by: NURSE PRACTITIONER

## 2021-08-29 PROCEDURE — 85004 AUTOMATED DIFF WBC COUNT: CPT | Performed by: INTERNAL MEDICINE

## 2021-08-29 PROCEDURE — 99221 1ST HOSP IP/OBS SF/LOW 40: CPT | Mod: AI | Performed by: INTERNAL MEDICINE

## 2021-08-29 PROCEDURE — 250N000013 HC RX MED GY IP 250 OP 250 PS 637: Performed by: NURSE PRACTITIONER

## 2021-08-29 PROCEDURE — 250N000011 HC RX IP 250 OP 636: Performed by: NURSE PRACTITIONER

## 2021-08-29 PROCEDURE — 258N000003 HC RX IP 258 OP 636: Performed by: NURSE PRACTITIONER

## 2021-08-29 PROCEDURE — 258N000003 HC RX IP 258 OP 636: Performed by: INTERNAL MEDICINE

## 2021-08-29 PROCEDURE — 85379 FIBRIN DEGRADATION QUANT: CPT | Performed by: INTERNAL MEDICINE

## 2021-08-29 PROCEDURE — 81001 URINALYSIS AUTO W/SCOPE: CPT | Performed by: NURSE PRACTITIONER

## 2021-08-29 PROCEDURE — 80053 COMPREHEN METABOLIC PANEL: CPT | Performed by: INTERNAL MEDICINE

## 2021-08-29 PROCEDURE — 71045 X-RAY EXAM CHEST 1 VIEW: CPT

## 2021-08-29 PROCEDURE — 85610 PROTHROMBIN TIME: CPT | Performed by: INTERNAL MEDICINE

## 2021-08-29 PROCEDURE — 83615 LACTATE (LD) (LDH) ENZYME: CPT | Performed by: INTERNAL MEDICINE

## 2021-08-29 RX ORDER — BISACODYL 10 MG
10 SUPPOSITORY, RECTAL RECTAL DAILY PRN
Status: DISCONTINUED | OUTPATIENT
Start: 2021-08-29 | End: 2021-09-01 | Stop reason: HOSPADM

## 2021-08-29 RX ORDER — LEVOTHYROXINE SODIUM 88 UG/1
88 TABLET ORAL DAILY
Status: DISCONTINUED | OUTPATIENT
Start: 2021-08-29 | End: 2021-09-01 | Stop reason: HOSPADM

## 2021-08-29 RX ORDER — ALBUTEROL SULFATE 90 UG/1
2 AEROSOL, METERED RESPIRATORY (INHALATION) 4 TIMES DAILY
Status: DISCONTINUED | OUTPATIENT
Start: 2021-08-29 | End: 2021-09-01 | Stop reason: HOSPADM

## 2021-08-29 RX ORDER — ACETAMINOPHEN 325 MG/1
650 TABLET ORAL EVERY 6 HOURS PRN
Status: DISCONTINUED | OUTPATIENT
Start: 2021-08-29 | End: 2021-09-01 | Stop reason: HOSPADM

## 2021-08-29 RX ORDER — OXYBUTYNIN CHLORIDE 5 MG/1
5 TABLET ORAL 3 TIMES DAILY
Status: DISCONTINUED | OUTPATIENT
Start: 2021-08-30 | End: 2021-09-01 | Stop reason: HOSPADM

## 2021-08-29 RX ORDER — ACETAMINOPHEN 650 MG/1
650 SUPPOSITORY RECTAL EVERY 6 HOURS PRN
Status: DISCONTINUED | OUTPATIENT
Start: 2021-08-29 | End: 2021-09-01 | Stop reason: HOSPADM

## 2021-08-29 RX ORDER — FINASTERIDE 5 MG/1
5 TABLET, FILM COATED ORAL DAILY
Status: DISCONTINUED | OUTPATIENT
Start: 2021-08-30 | End: 2021-09-01 | Stop reason: HOSPADM

## 2021-08-29 RX ORDER — AMOXICILLIN 250 MG
2 CAPSULE ORAL 2 TIMES DAILY PRN
Status: DISCONTINUED | OUTPATIENT
Start: 2021-08-29 | End: 2021-09-01 | Stop reason: HOSPADM

## 2021-08-29 RX ORDER — LISINOPRIL 10 MG/1
10 TABLET ORAL DAILY
Status: DISCONTINUED | OUTPATIENT
Start: 2021-08-30 | End: 2021-09-01 | Stop reason: HOSPADM

## 2021-08-29 RX ORDER — ONDANSETRON 2 MG/ML
4 INJECTION INTRAMUSCULAR; INTRAVENOUS EVERY 6 HOURS PRN
Status: DISCONTINUED | OUTPATIENT
Start: 2021-08-29 | End: 2021-09-01 | Stop reason: HOSPADM

## 2021-08-29 RX ORDER — TAMSULOSIN HYDROCHLORIDE 0.4 MG/1
0.4 CAPSULE ORAL DAILY
Status: DISCONTINUED | OUTPATIENT
Start: 2021-08-30 | End: 2021-09-01 | Stop reason: HOSPADM

## 2021-08-29 RX ORDER — AMOXICILLIN 250 MG
1 CAPSULE ORAL 2 TIMES DAILY PRN
Status: DISCONTINUED | OUTPATIENT
Start: 2021-08-29 | End: 2021-09-01 | Stop reason: HOSPADM

## 2021-08-29 RX ORDER — SODIUM CHLORIDE, SODIUM LACTATE, POTASSIUM CHLORIDE, CALCIUM CHLORIDE 600; 310; 30; 20 MG/100ML; MG/100ML; MG/100ML; MG/100ML
INJECTION, SOLUTION INTRAVENOUS CONTINUOUS
Status: DISCONTINUED | OUTPATIENT
Start: 2021-08-29 | End: 2021-08-30

## 2021-08-29 RX ORDER — AMLODIPINE BESYLATE 5 MG/1
5 TABLET ORAL DAILY
Status: DISCONTINUED | OUTPATIENT
Start: 2021-08-30 | End: 2021-09-01 | Stop reason: HOSPADM

## 2021-08-29 RX ORDER — ONDANSETRON 4 MG/1
4 TABLET, ORALLY DISINTEGRATING ORAL EVERY 6 HOURS PRN
Status: DISCONTINUED | OUTPATIENT
Start: 2021-08-29 | End: 2021-09-01 | Stop reason: HOSPADM

## 2021-08-29 RX ORDER — POLYETHYLENE GLYCOL 3350 17 G/17G
17 POWDER, FOR SOLUTION ORAL DAILY PRN
Status: DISCONTINUED | OUTPATIENT
Start: 2021-08-29 | End: 2021-09-01 | Stop reason: HOSPADM

## 2021-08-29 RX ORDER — LIDOCAINE 40 MG/G
CREAM TOPICAL
Status: DISCONTINUED | OUTPATIENT
Start: 2021-08-29 | End: 2021-09-01 | Stop reason: HOSPADM

## 2021-08-29 RX ADMIN — SODIUM CHLORIDE, POTASSIUM CHLORIDE, SODIUM LACTATE AND CALCIUM CHLORIDE: 600; 310; 30; 20 INJECTION, SOLUTION INTRAVENOUS at 02:33

## 2021-08-29 RX ADMIN — ALBUTEROL SULFATE 2 PUFF: 90 AEROSOL, METERED RESPIRATORY (INHALATION) at 20:47

## 2021-08-29 RX ADMIN — TAZOBACTAM SODIUM AND PIPERACILLIN SODIUM 4.5 G: 500; 4 INJECTION, SOLUTION INTRAVENOUS at 20:47

## 2021-08-29 RX ADMIN — ALBUTEROL SULFATE 2 PUFF: 90 AEROSOL, METERED RESPIRATORY (INHALATION) at 08:19

## 2021-08-29 RX ADMIN — AZITHROMYCIN 500 MG: 500 INJECTION, POWDER, LYOPHILIZED, FOR SOLUTION INTRAVENOUS at 11:57

## 2021-08-29 RX ADMIN — TAZOBACTAM SODIUM AND PIPERACILLIN SODIUM 4.5 G: 500; 4 INJECTION, SOLUTION INTRAVENOUS at 08:19

## 2021-08-29 RX ADMIN — ALBUTEROL SULFATE 2 PUFF: 90 AEROSOL, METERED RESPIRATORY (INHALATION) at 15:51

## 2021-08-29 RX ADMIN — TAZOBACTAM SODIUM AND PIPERACILLIN SODIUM 4.5 G: 500; 4 INJECTION, SOLUTION INTRAVENOUS at 13:51

## 2021-08-29 RX ADMIN — ENOXAPARIN SODIUM 40 MG: 100 INJECTION SUBCUTANEOUS at 02:32

## 2021-08-29 RX ADMIN — ENOXAPARIN SODIUM 40 MG: 100 INJECTION SUBCUTANEOUS at 20:47

## 2021-08-29 RX ADMIN — LEVOTHYROXINE SODIUM 88 MCG: 88 TABLET ORAL at 09:39

## 2021-08-29 RX ADMIN — TAZOBACTAM SODIUM AND PIPERACILLIN SODIUM 4.5 G: 500; 4 INJECTION, SOLUTION INTRAVENOUS at 02:31

## 2021-08-29 RX ADMIN — SODIUM CHLORIDE, POTASSIUM CHLORIDE, SODIUM LACTATE AND CALCIUM CHLORIDE: 600; 310; 30; 20 INJECTION, SOLUTION INTRAVENOUS at 15:52

## 2021-08-29 RX ADMIN — ALBUTEROL SULFATE 2 PUFF: 90 AEROSOL, METERED RESPIRATORY (INHALATION) at 11:57

## 2021-08-29 ASSESSMENT — ACTIVITIES OF DAILY LIVING (ADL)
ADLS_ACUITY_SCORE: 18
DEPENDENT_IADLS:: CLEANING;COOKING;LAUNDRY;SHOPPING;MONEY MANAGEMENT
ADLS_ACUITY_SCORE: 18

## 2021-08-29 ASSESSMENT — MIFFLIN-ST. JEOR: SCORE: 1203.39

## 2021-08-29 NOTE — H&P
[facility]    History and Physical - Hospitalist Service       Date of Admission:  8/28/2021    Assessment & Plan      Roberto Carlos Arce is a 81 year old male admitted on 8/28/2021. He presents with worsening nausea/vomiting and weakness.      1. HCAP Pneumonia      COVID-19  The patient presents with weakness, nausea and vomiting and was recently treated for COVID-19 at the hospital at [facility]. He was discharged on home oxygen at 2LNC. He is now more symptomatic and had a lactic acidosis. He had a possible pneumonia on X-ray. He was transferred here for continued care.   - admit to hospitalist service.   - supplemental oxygen  - c/w vancomycin and piperacillin/tazobactem  - f/u sputum and blood cultures from outside hospital  - trend COVID labs  - unable to give dexamethasone since he has already been treated with it  - contact and droplet precautions    2. Somnolence  Unclear why he is so somnolent or what his baseline might be.   - check ABG  - check Ammonia  - contact family in the morning    3. Elevated LFTs  The patient has elevated T. Bili. An ultrasound was done that was normal. A CT was then done that showed a possible ileus vs. PUD.  - trend LFTs for now  - check hepatitis panel    4. Chronic urinary retention from BPH  The patient was noted to have a distended bladder in the ED and had a urinary cathter placed. He has a history of BPH and urinary retention.   - c/w tamsulosin and finasteride as well as oxybutynin  - c/w urinary catheter maintenance    5. Hypothyroidism  - c/w levothyroxine    6. Hypertension  - c/w lisinopril, amlodipine    7. Hyperlipidemia  - c/w rosuvastatin         Diet: Combination Diet Low Saturated Fat Na <2400mg Diet, No Caffeine Diet    DVT Prophylaxis: Enoxaparin (Lovenox) SQ  Guzman Catheter: PRESENT, indication: Retention  Central Lines: None  Code Status: Full Code      Clinically Significant Risk Factors Present on Admission                Disposition Plan   Expected discharge:  5 days to the recommended to prior living arrangement once antibiotic plan established and safe disposition plan/ TCU bed available.     The patient's care was discussed with the Patient.        Devin Pineda  [facility]  Securely message with the Vocera Web Console (learn more here)  Text page via [facility] Paging/Directory      Visit/Communication Style   Virtual (Video) communication was used to evaluate Roberto Carlos.  Roberto Carlos consented to the use of video communication: yes  Video START time: 0450, 8/29/2021  Video STOP time: 0500, 8/29/2021   Patient's location: [facility]   Provider's location during the visit: [facility] Tele-medicine site        ______________________________________________________________________    Chief Complaint   Nauseaand weakness    Unable to obtain a history from the patient due to mental status    History of Present Illness   Roberto Carlos Arce is a 81 year old male who presented to the hospital at [facility] with nausea/vomiting and weakness. He was diagnosed with COVID-19 on 08/10/2021 and had to be hospitalised at the [facility]. He was discharged home after treatment and was put on home oxygen. He has become more nauseated and weak in the last few days, however, and he was seen at [facility] and was found to be more lethargic and symptomatic. There was a concern that he might have developed an HCAP so he was sent here for treatment. He was unable to provide any history because he was so somnolent, only responding for a few seconds before falling asleep again.      Review of Systems    Unable to obtain.     Past Medical History    I have reviewed this patient's medical history and updated it with pertinent information if needed.   No past medical history on file.    Past Surgical History   I have reviewed this patient's surgical history and  updated it with pertinent information if needed.  Past Surgical History:   Procedure Laterality Date     HERNIA REPAIR       [facility]  7/11/2016            Social History   I have reviewed this patient's social history and updated it with pertinent information if needed.  Social History     Tobacco Use     Smoking status: Never Smoker     Smokeless tobacco: Never Used   Substance Use Topics     Alcohol use: No     Drug use: No       Family History   I have reviewed this patient's family history and updated it with pertinent information if needed.  Family History   Problem Relation Age of Onset     Hypertension Mother      Cerebrovascular Disease Mother      Coronary Artery Disease Father      Hypertension Father      Cerebrovascular Disease Brother        Prior to Admission Medications   Prior to Admission Medications   Prescriptions Last Dose Informant Patient Reported? Taking?   Chelated Potassium 99 MG TABS 8/28/2021 at Unknown time Self Yes Yes   Sig: Take 1 tablet by mouth daily   Cholecalciferol (VITAMIN D3) 1000 units CAPS 8/28/2021 at Unknown time Self Yes Yes   Sig: Take 2,000 Units by mouth daily   Lutein 20 MG CAPS 8/28/2021 at Unknown time Self Yes Yes   Sig: Take 20 mg by mouth 2 times daily    amLODIPine (NORVASC) 5 MG tablet 8/28/2021 at Unknown time Self Yes Yes   Sig: Take 5 mg by mouth daily   aspirin 325 MG tablet 8/28/2021 at Unknown time Self Yes Yes   Sig: Take 325 mg by mouth daily   chlorproMAZINE (THORAZINE) 25 MG tablet Unknown at Unknown time Self Yes No   Sig: Take 25 mg by mouth 3 times daily as needed (Hiccups)    chlorproMAZINE (THORAZINE) 25 MG tablet 8/28/2021 at Unknown time  No Yes   Sig: Take 1 tablet (25 mg) by mouth 3 times daily   finasteride (PROSCAR) 5 MG tablet 8/28/2021 at Unknown time Self Yes Yes   Sig: Take 5 mg by mouth daily   levothyroxine (SYNTHROID/LEVOTHROID) 88 MCG tablet 8/28/2021 at Unknown time Self Yes Yes   Sig: Take 88 mcg by mouth daily   lisinopril  (PRINIVIL/ZESTRIL) 10 MG tablet 8/28/2021 at Unknown time Self Yes Yes   Sig: Take 10 mg by mouth daily   oxybutynin (DITROPAN) 5 MG tablet   No No   Sig: Take 1 tablet (5 mg) by mouth 3 times daily   rosuvastatin (CRESTOR) 10 MG tablet Unknown at Unknown time Self Yes No   Sig: Take 10 mg by mouth At Bedtime   tamsulosin (FLOMAX) 0.4 MG capsule 8/28/2021 at Unknown time Self Yes Yes   Sig: Take 0.4 mg by mouth daily      Facility-Administered Medications: None     Allergies   No Known Allergies    Physical Exam   Vital Signs: Temp: 98.4  F (36.9  C) Temp src: Oral BP: 121/61 Pulse: 66   Resp: 16 SpO2: 97 % O2 Device: Nasal cannula Oxygen Delivery: 1 LPM  Weight: 131 lbs 11.2 oz    Gen:  Lethargic and   HEENT:  Anicteric sclera, PER, hearing intact to voice  Resp:  No accessory muscle use, breath sounds clear; no wheezes no rales no rhonchi  Card:  No murmur, normal S1, S2   Abd:  Soft per RN exam, no TTP, non-distended, normoactive bowel sounds are present  Musc:  Grossly normal strength and movement of the major muscle groups without obvious deformity  Psych:  Patient unable to participate in this part of the exam    Data     No lab results found in last 7 days.      No results found for this or any previous visit (from the past 24 hour(s)).

## 2021-08-29 NOTE — PLAN OF CARE
S-(situation): Patient arrives to room 250 via cart from EMS, [facility].    B-(background): Patient admitted for persistent pneumonia and UTI. Patient tested positive for Covid 19 on Aug. 10th. Brought to [facility] by EMS for increased lethargy. Guzman placed by [facility]. Report received by Michaela HEBERT, said to have updated daughter Wanda. Patient reported to come from home with wife who is currently in the hospital in [place]. Hx of TIA, encephalopathy, BPH and intracranial bleeding.     A-(assessment): Patient is very lethargic, oriented to self only. Patient will fall asleep before fully answering questions. Vital signs are stable. Patient is on 1L NC at 98%. Rapid covid test positive 8/29. Patient unable to transfer self from stretcher to bed but stated he uses walker at home. Denies needing assistance at home but should be reevaluated. Sputum culture pending.     R-(recommendations): Orders reviewed with patient. Will monitor patient per MD orders.     Inpatient nursing criteria listed below were met:    Health care directives status obtained and documented: Yes  SCD's Documented: Yes  Skin issues/needs documented:NA  Isolation addressed and Signage used: Yes  Fall Prevention: Care plan updated Yes Education given and documented Yes  Care Plan initiated and Co-Morbidities added: Yes  Education Assessment documented:Yes  Admission Education Documented: Yes  If present CAUTI/CLABI Education done: Yes  New medication patient education completed and documented (Possible Side Effects of Common Medications handout): Yes  Allergies Reviewed: Yes  Admission Medication Reconciliation completed: No, unable to complete per patient, recommend calling Wanda (daughter) in AM.  Home medications if not able to send immediately home with family stored here: NA  Reminder note placed in discharge instructions regarding home meds: NA  Individualized care needs/preferences addressed and charted: Yes     Rosario Walker RN on  8/29/2021 at 3:24 AM    Problem: Adult Inpatient Plan of Care  Goal: Plan of Care Review  Outcome: No Change  Goal: Patient-Specific Goal (Individualized)  Outcome: No Change  Goal: Absence of Hospital-Acquired Illness or Injury  Outcome: No Change  Goal: Optimal Comfort and Wellbeing  Outcome: No Change  Goal: Readiness for Transition of Care  Outcome: No Change  Intervention: Mutually Develop Transition Plan  Recent Flowsheet Documentation  Taken 8/29/2021 0200 by Rosario Walker, RN  Equipment Currently Used at Home: walker, standard     Problem: Risk for Delirium  Goal: Optimal Coping  Outcome: No Change  Goal: Improved Behavioral Control  Outcome: No Change  Goal: Improved Attention and Thought Clarity  Outcome: No Change  Goal: Improved Sleep  Outcome: No Change     Problem: Hypertension Comorbidity  Goal: Blood Pressure in Desired Range  Outcome: No Change

## 2021-08-29 NOTE — PLAN OF CARE
DATE:  8/29/2021   TIME OF RECEIPT FROM LAB:  0313  LAB TEST:  COVID19   LAB VALUE:  Positive  RESULTS GIVEN WITH READ-BACK TO (PROVIDER):  Devin Pineda  TIME LAB VALUE REPORTED TO PROVIDER:   0320    Rosario Walker RN on 8/29/2021 at 3:37 AM

## 2021-08-29 NOTE — CONSULTS
Care Management Initial Consult    General Information  Assessment completed with: Children, Daughters- Rossana  Type of CM/SW Visit: Initial Assessment    Primary Care Provider verified and updated as needed:     Readmission within the last 30 days: previous discharge plan unsuccessful         Advance Care Planning:    Has no scanned ACP documents        Communication Assessment  Patient's communication style: spoken language (English or Bilingual)    Hearing Difficulty or Deaf: no   Wear Glasses or Blind: yes    Cognitive  Cognitive/Neuro/Behavioral: .WDL except  Level of Consciousness: lethargic  Arousal Level: arouses to voice  Orientation: disoriented to, time, situation     Best Language: 0 - No aphasia  Speech: slow, clear    Living Environment:   People in home: spouse  wifeAdolfo Guardado   Current living Arrangements: mobile home      Able to return to prior arrangements: yes       Family/Social Support:  Care provided by: self, child(te)  Provides care for: no one  Marital Status:   Wife, Children  Debby        Description of Support System: Supportive, Involved    Support Assessment: Adequate family and caregiver support, Adequate social supports    Current Resources:   Patient receiving home care services: Yes  Skilled Home Care Services: Skilled Nursing, Physicial Therapy  Community Resources: None  Equipment currently used at home: walker, standard  Supplies currently used at home:      Employment/Financial:  Employment Status: retired        Financial Concerns: No concerns identified           Lifestyle & Psychosocial Needs:  Social Determinants of Health     Tobacco Use: Low Risk      Smoking Tobacco Use: Never Smoker     Smokeless Tobacco Use: Never Used   Alcohol Use:      Frequency of Alcohol Consumption:      Average Number of Drinks:      Frequency of Binge Drinking:    Financial Resource Strain:      Difficulty of Paying Living Expenses:    Food Insecurity:      Worried About  Running Out of Food in the Last Year:      Ran Out of Food in the Last Year:    Transportation Needs:      Lack of Transportation (Medical):      Lack of Transportation (Non-Medical):    Physical Activity:      Days of Exercise per Week:      Minutes of Exercise per Session:    Stress:      Feeling of Stress :    Social Connections:      Frequency of Communication with Friends and Family:      Frequency of Social Gatherings with Friends and Family:      Attends Hinduism Services:      Active Member of Clubs or Organizations:      Attends Club or Organization Meetings:      Marital Status:    Intimate Partner Violence:      Fear of Current or Ex-Partner:      Emotionally Abused:      Physically Abused:      Sexually Abused:    Depression:      PHQ-2 Score:    Housing Stability:      Unable to Pay for Housing in the Last Year:      Number of Places Lived in the Last Year:      Unstable Housing in the Last Year:        Functional Status:  Prior to admission patient needed assistance:   Dependent ADLs:: Independent  Dependent IADLs:: Cleaning, Cooking, Laundry, Shopping, Money Management       Mental Health Status:  Mental Health Status: No Current Concerns       Chemical Dependency Status:  Chemical Dependency Status: No Current Concerns             Values/Beliefs:  Spiritual, Cultural Beliefs, Hinduism Practices, Values that affect care:            Values/Beliefs Comment: Unknown     Additional Information:  Care Management has been consulted for discharge planning.  Nursing reported that patient is too sick to talk over the phone at this time.  Writer called and spoke with patient's daughters, Wanda and Telma.  Reviewed the above information.    Daughters reported that patient had been at [facility] recently and then went back home with [facility] #[phone]- RN and PT services.  Patient's wife, Debby, is currently admitted at [facility] in [place].  They were full, so patient came  "to the hospital here.      Discussed discharge planning.  Care Management will continue to assess needs.  It is unknown if patient will be strong enough to discharge back home or if he will need TCU.  Discussed that patient has [employer] Medicare Advantage insurance and that only certain TCU's accept that insurance.  There is also question if patient will need a TCU that accepts COVID patients or if he will be considered \"COVID recovered\".  Daughters are very supportive and involved with both patient and his wife.  They stated that is patient and his wife both need TCU placement, they would like them at the same facility if at all possible.  Writer provided daughters with the Care Management office phone number.  They thanked writer for the call.      Care Management will continue to follow and work with patient and family on safe discharge plan.     REGINALDO Adkins  [facility]   [phone]     "

## 2021-08-29 NOTE — PLAN OF CARE
Problem: Adult Inpatient Plan of Care  Goal: Absence of Hospital-Acquired Illness or Injury  Intervention: Identify and Manage Fall Risk  Recent Flowsheet Documentation  Taken 8/29/2021 0800 by Sara Cazares RN  Safety Promotion/Fall Prevention:   bed alarm on   fall prevention program maintained  Intervention: Prevent Skin Injury  Recent Flowsheet Documentation  Taken 8/29/2021 0800 by Sara Cazares RN  Body Position: turned  Intervention: Prevent and Manage VTE (Venous Thromboembolism) Risk  Recent Flowsheet Documentation  Taken 8/29/2021 0800 by Sara Cazares RN  VTE Prevention/Management: anticoagulant therapy maintained     Problem: Fluid Imbalance (Pneumonia)  Goal: Fluid Balance  Outcome: No Change     Problem: Infection (Pneumonia)  Goal: Resolution of Infection Signs and Symptoms  Outcome: No Change  Intervention: Prevent Infection Progression  Recent Flowsheet Documentation  Taken 8/29/2021 0800 by Sara Cazares RN  Isolation Precautions:   airborne precautions maintained   contact precautions maintained   droplet precautions maintained     Problem: Respiratory Compromise (Pneumonia)  Goal: Effective Oxygenation and Ventilation  Outcome: Improving  Intervention: Promote Airway Secretion Clearance  Recent Flowsheet Documentation  Taken 8/29/2021 0800 by Sara Cazares RN  Cough And Deep Breathing: done with encouragement  Intervention: Optimize Oxygenation and Ventilation  Recent Flowsheet Documentation  Taken 8/29/2021 0800 by Sara Cazares RN  Head of Bed (HOB) Positioning: HOB at 30 degrees

## 2021-08-29 NOTE — PROGRESS NOTES
[facility]    Medicine Progress Note - Hospitalist Service       Date of Admission:  8/28/2021    Assessment & Plan               HCAP (healthcare-associated pneumonia)    Acute encephalopathy  Patient presents with weakness, nausea and vomiting and was recently treated for COVID-19 at the hospital at [facility]. He was discharged on home oxygen at [facility]. He presented to the ER with increased confusion, poor oral intake and SOB. Patient has a worsening bilateral pneumonia on X-ray, presumed to be bacterial. Patient was given Vanco and Zosyn. MRSA swab pending. Pro calcitonin pending.   Plan:  -  Continue piperacillin/tazobactem  - f/u sputum and blood cultures from outside hospital  - trend COVID labs  - unable to give dexamethasone since he has already been treated with it  - Added Zithromax  - Oxygen as needed to maintain saturations.   - Legionella and Strep Pneumo ordered  - Covid antibodies ordered, day 19 of illness      Urinary retention    Benign prostatic hyperplasia with urinary retention    History of 2019 novel coronavirus disease (COVID-19)  Assessment: The patient was noted to have a distended bladder in the ED and had a urinary cathter placed. He has a history of BPH and urinary retention.   Plan:  - Removed cha when able  - Urine culture pending - urine does not appear to be largely infected  - c/w tamsulosin and finasteride as well as oxybutynin  - c/w urinary catheter maintenance        Elevated LFTs    Ileus - Possible  Assessment:  The patient has elevated T. Bili. An ultrasound was done that was normal. A CT was then done that showed a possible ileus vs. PUD. Patient with no nausea or pain, tolerating oral intake.   Plan:  - trend LFTs for now  - check hepatitis panel    Hyperlipidemia  - c/w rosuvastatin    Hypertension  - c/w lisinopril, amlodipine    Hypothyroidism  - c/w levothyroxine       Diet: Combination Diet Low Saturated Fat Na <2400mg Diet     DVT Prophylaxis: Enoxaparin (Lovenox) SQ  Guzman Catheter: PRESENT, indication: Retention  Central Lines: None  Code Status: Full Code      Disposition Plan   Expected discharge: 08/31/2021 recommended to Home vs TCU once antibiotic plan established, mental status at baseline and safe disposition plan/ TCU bed available.     The patient's care was discussed with the Attending Physician, Dr. Stanton, Bedside Nurse, Care Coordinator/, Patient and Patient's Family.    Guera Lambert, [facility]  Hospitalist Service  [facility]  Securely message with the Vocera Web Console (learn more here)  Text page via [facility] Paging/Directory      Clinically Significant Risk Factors Present on Admission               ______________________________________________________________________    Interval History       Data reviewed today: I reviewed all medications, new labs and imaging results over the last 24 hours.      Physical Exam   Vital Signs: Temp: 96.9  F (36.1  C) Temp src: Oral BP: 116/59 Pulse: 53   Resp: 16 SpO2: 96 % O2 Device: None (Room air) Oxygen Delivery: 1 LPM  Weight: 131 lbs 11.2 oz      Data   Recent Labs   Lab 08/29/21 0423 08/29/21  0422   WBC  --  10.8   HGB  --  12.1*   MCV  --  90   PLT  --  185   INR 1.43*  --      --    POTASSIUM 4.0  --    CHLORIDE 109  --    CO2 26  --    BUN 22  --    CR 0.81  --    ANIONGAP 2*  --    SERENITY 8.6  --    *  --    ALBUMIN 2.1*  --    PROTTOTAL 5.0*  --    BILITOTAL 1.4*  --    ALKPHOS 50  --    ALT 47  --    AST 19  --    TROPONIN <0.015  --      Recent Results (from the past 24 hour(s))   XR Chest Port 1 View    Narrative    EXAM: XR CHEST PORT 1 VIEW  LOCATION: [facility]  DATE/TIME: 8/29/2021 4:20 AM    INDICATION: Pneumonia  COMPARISON: 08/10/2021      Impression    IMPRESSION: Patchy bilateral perihilar and lower lung infiltrates. Findings compatible with bilateral pneumonia, increased  since 08/10/2021. Normal heart size and pulmonary vascularity. Tortuous and calcified thoracic aorta. Degenerative changes in the   spine and shoulders.       ER VISIT FROM [facility] 8/28    Roberto Carlos Arce is a 81 y.o. male with history of intracranial hemorrhage and encephalomalacia, hypertension, BPH, SURENDRA, hypothyroidism and recent COVID-19 infection diagnosed on 8/10/2021 who presents to the ED by EMS for ongoing weakness and vomiting for the last couple days. Patient was initially hospitalized for COVID-19 from August 10-13 and discharged on home care and home oxygen. Family states 2 days ago he started to become weak with vomiting though report his breathing and oxygen level has remained stable. Per EMS blood sugar was 260 and he was given 650 cc of saline prior to arrival and they feel he has perked up some since administration of fluids. Patient denies any abdominal pain or changes in urination, increased shortness of breath or chest pain. He is anticoagulated on Xarelto. HPI is limited due to limited responses from patient.    Roberto Carlos Arce is a 81 y.o. male who presents to the ED with vomiting and increasing weakness over the past couple days. History is limited as patient has baseline confusion and appears somewhat sleepy. He has no abdominal tenderness on exam. Sepsis work-up was obtained and relevant findings include elevated lactate of 2.8 which did improve after 15 cc/kg bolus down to 1.6. He is elevated LFTs, specifically total bilirubin so ultrasound was obtained to rule out cholangitis or other biliary obstruction but imaging was poor due to bowel gas. Blood cultures were sent and are pending at this time. Patient has leukocytosis with left shift and chest x-ray shows new bibasilar infiltrates. CT abdomen pelvis also obtained due to bowel gas and concern for biliary obstruction and showed bladder distention, likely due to enlarged prostate with renal reflux likely due to his bladder  distention. Catheter was placed to drain bladder. Patient also noted to have thickening of the stomach which could correlate with gastritis or peptic ulcer disease and further evaluation is recommended. Is gastric distention of the transverse colon which could be due to colonic ileus. EKG shows sinus tachycardia with some PVCs but no STEMI and patient has normal troponin. At this time unclear whether sepsis is due to new onset pneumonia versus ileus and gastritis but patient requires admission. Vitals have remained stable throughout his stay here in the ED. He was given antibiotics as noted above. Unfortunately, there are no available beds here at [facility] so patient needs to be transferred to another facility, thankfully I was able to procure a bed at [facility].     CT ABD  1. Moderate new peribronchial and subpleural consolidation noted. Clinical correlation is recommended to exclude COVID-19 infection. 2. Severe bladder distention is noted. 3. Moderate enlargement of the prostate gland is noted. Correlation with physical examination and PSA levels are recommended. 4. Moderate gaseous distention of the transverse colon is noted which may be due to prolonged supine positioning or colonic ileus. 5. Mild bilateral renal pelviectasis is present which may be due to vesical ureteral reflux from the bladder distension. 6. Mild wall thickening of the gastric antrum is present. This may be due to gastritis or peptic ulcer disease and confirmation with barium GI series or endoscopy is recommended    CT HEAD  No evidence of acute infarction, intracranial hemorrhage, or mass-effect seen.       HOME MEDS  albuterol (PROVENTIL) 0.083 % neb solution  amLODIPine (NORVASC) 5 mg tablet  aspirin 325 mg tablet  chlorproMAZINE (THORAZINE) 25 mg tablet  cholecalciferol (VITAMIN D) 1,000 unit capsule  coffee xt-phosphatidyl serine (Neuriva Original) 100-100 mg cap  finasteride (PROSCAR) 5 mg  tablet  levothyroxine (SYNTHROID) 88 mcg tablet  lisinopriL (PRINIVIL; ZESTRIL) 10 mg tablet  lutein 20 mg cap  Nebulizer  oxygen-air delivery systems (HOME OXYGEN)  polyethylene glycoL (MIRALAX) 17 gram/dose powder  Potassium 99 mg disintegrating tablet  rivaroxaban (XARELTO) 10 mg tablet  rosuvastatin (CRESTOR) 10 mg tablet  saw/vit E/sod tylor/lyc/beta/pyg (PROSTATE HEALTH ORAL)  sennosides-docusate (SENOKOT S) (8.6-50 mg) tablet  sildenafil, antihypertensive, (REVATIO) 20 mg tablet  tamsulosin (FLOMAX) 0.4 mg capsule  vit C-vit E-copper-zinc ox-lutein (PRESERVISION) 226 mg-200 unit -5 mg-0.8 mg cap  vitamin e 400 unit capsule

## 2021-08-30 ENCOUNTER — APPOINTMENT (OUTPATIENT)
Dept: PHYSICAL THERAPY | Facility: CLINIC | Age: 81
DRG: 194 | End: 2021-08-30
Attending: NURSE PRACTITIONER
Payer: COMMERCIAL

## 2021-08-30 LAB
ALBUMIN SERPL-MCNC: 2.1 G/DL (ref 3.4–5)
ALP SERPL-CCNC: 52 U/L (ref 40–150)
ALT SERPL W P-5'-P-CCNC: 57 U/L (ref 0–70)
ANION GAP SERPL CALCULATED.3IONS-SCNC: 6 MMOL/L (ref 3–14)
AST SERPL W P-5'-P-CCNC: 37 U/L (ref 0–45)
BACTERIA UR CULT: NO GROWTH
BILIRUB DIRECT SERPL-MCNC: 0.4 MG/DL (ref 0–0.2)
BILIRUB SERPL-MCNC: 1.4 MG/DL (ref 0.2–1.3)
BUN SERPL-MCNC: 17 MG/DL (ref 7–30)
C PNEUM DNA SPEC QL NAA+PROBE: NOT DETECTED
CALCIUM SERPL-MCNC: 8.6 MG/DL (ref 8.5–10.1)
CHLORIDE BLD-SCNC: 108 MMOL/L (ref 94–109)
CO2 SERPL-SCNC: 24 MMOL/L (ref 20–32)
CREAT SERPL-MCNC: 0.79 MG/DL (ref 0.66–1.25)
CRP SERPL-MCNC: 31 MG/L (ref 0–8)
D DIMER PPP FEU-MCNC: 0.75 UG/ML FEU (ref 0–0.5)
ERYTHROCYTE [DISTWIDTH] IN BLOOD BY AUTOMATED COUNT: 13.1 % (ref 10–15)
FIBRINOGEN PPP-MCNC: 581 MG/DL (ref 170–490)
FLUAV H1 2009 PAND RNA SPEC QL NAA+PROBE: NOT DETECTED
FLUAV H1 RNA SPEC QL NAA+PROBE: NOT DETECTED
FLUAV H3 RNA SPEC QL NAA+PROBE: NOT DETECTED
FLUAV RNA SPEC QL NAA+PROBE: NOT DETECTED
FLUBV RNA SPEC QL NAA+PROBE: NOT DETECTED
GFR SERPL CREATININE-BSD FRML MDRD: 83 ML/MIN/1.73M2
GLUCOSE BLD-MCNC: 89 MG/DL (ref 70–99)
HADV DNA SPEC QL NAA+PROBE: NOT DETECTED
HCOV PNL SPEC NAA+PROBE: NOT DETECTED
HCT VFR BLD AUTO: 34.5 % (ref 40–53)
HGB BLD-MCNC: 12 G/DL (ref 13.3–17.7)
HMPV RNA SPEC QL NAA+PROBE: NOT DETECTED
HPIV1 RNA SPEC QL NAA+PROBE: NOT DETECTED
HPIV2 RNA SPEC QL NAA+PROBE: NOT DETECTED
HPIV3 RNA SPEC QL NAA+PROBE: NOT DETECTED
HPIV4 RNA SPEC QL NAA+PROBE: NOT DETECTED
M PNEUMO DNA SPEC QL NAA+PROBE: NOT DETECTED
MAGNESIUM SERPL-MCNC: 1.7 MG/DL (ref 1.6–2.3)
MCH RBC QN AUTO: 31.2 PG (ref 26.5–33)
MCHC RBC AUTO-ENTMCNC: 34.8 G/DL (ref 31.5–36.5)
MCV RBC AUTO: 90 FL (ref 78–100)
MRSA DNA SPEC QL NAA+PROBE: NEGATIVE
PLATELET # BLD AUTO: 149 10E3/UL (ref 150–450)
POTASSIUM BLD-SCNC: 3.7 MMOL/L (ref 3.4–5.3)
PROT SERPL-MCNC: 5.2 G/DL (ref 6.8–8.8)
RBC # BLD AUTO: 3.85 10E6/UL (ref 4.4–5.9)
RSV RNA SPEC QL NAA+PROBE: NOT DETECTED
RSV RNA SPEC QL NAA+PROBE: NOT DETECTED
RV+EV RNA SPEC QL NAA+PROBE: NOT DETECTED
SA TARGET DNA: NEGATIVE
SODIUM SERPL-SCNC: 138 MMOL/L (ref 133–144)
WBC # BLD AUTO: 8.5 10E3/UL (ref 4–11)

## 2021-08-30 PROCEDURE — 85027 COMPLETE CBC AUTOMATED: CPT | Performed by: INTERNAL MEDICINE

## 2021-08-30 PROCEDURE — 97161 PT EVAL LOW COMPLEX 20 MIN: CPT | Mod: GP | Performed by: PHYSICAL THERAPIST

## 2021-08-30 PROCEDURE — 87581 M.PNEUMON DNA AMP PROBE: CPT | Performed by: NURSE PRACTITIONER

## 2021-08-30 PROCEDURE — 86140 C-REACTIVE PROTEIN: CPT | Performed by: INTERNAL MEDICINE

## 2021-08-30 PROCEDURE — 87633 RESP VIRUS 12-25 TARGETS: CPT | Performed by: NURSE PRACTITIONER

## 2021-08-30 PROCEDURE — 120N000001 HC R&B MED SURG/OB

## 2021-08-30 PROCEDURE — 250N000011 HC RX IP 250 OP 636: Performed by: INTERNAL MEDICINE

## 2021-08-30 PROCEDURE — 82248 BILIRUBIN DIRECT: CPT | Performed by: NURSE PRACTITIONER

## 2021-08-30 PROCEDURE — 36415 COLL VENOUS BLD VENIPUNCTURE: CPT | Performed by: INTERNAL MEDICINE

## 2021-08-30 PROCEDURE — 250N000011 HC RX IP 250 OP 636: Performed by: NURSE PRACTITIONER

## 2021-08-30 PROCEDURE — 250N000013 HC RX MED GY IP 250 OP 250 PS 637: Performed by: NURSE PRACTITIONER

## 2021-08-30 PROCEDURE — 97110 THERAPEUTIC EXERCISES: CPT | Mod: GP | Performed by: PHYSICAL THERAPIST

## 2021-08-30 PROCEDURE — 99232 SBSQ HOSP IP/OBS MODERATE 35: CPT | Performed by: NURSE PRACTITIONER

## 2021-08-30 PROCEDURE — 83735 ASSAY OF MAGNESIUM: CPT | Performed by: INTERNAL MEDICINE

## 2021-08-30 PROCEDURE — 85384 FIBRINOGEN ACTIVITY: CPT | Performed by: INTERNAL MEDICINE

## 2021-08-30 PROCEDURE — 85379 FIBRIN DEGRADATION QUANT: CPT | Performed by: INTERNAL MEDICINE

## 2021-08-30 PROCEDURE — 258N000003 HC RX IP 258 OP 636: Performed by: INTERNAL MEDICINE

## 2021-08-30 PROCEDURE — 258N000003 HC RX IP 258 OP 636: Performed by: NURSE PRACTITIONER

## 2021-08-30 PROCEDURE — 82374 ASSAY BLOOD CARBON DIOXIDE: CPT | Performed by: NURSE PRACTITIONER

## 2021-08-30 RX ORDER — CEFDINIR 300 MG/1
300 CAPSULE ORAL EVERY 12 HOURS SCHEDULED
Status: DISCONTINUED | OUTPATIENT
Start: 2021-08-31 | End: 2021-09-01 | Stop reason: HOSPADM

## 2021-08-30 RX ADMIN — OXYBUTYNIN CHLORIDE 5 MG: 5 TABLET ORAL at 15:47

## 2021-08-30 RX ADMIN — TAZOBACTAM SODIUM AND PIPERACILLIN SODIUM 4.5 G: 500; 4 INJECTION, SOLUTION INTRAVENOUS at 09:19

## 2021-08-30 RX ADMIN — ALBUTEROL SULFATE 2 PUFF: 90 AEROSOL, METERED RESPIRATORY (INHALATION) at 09:19

## 2021-08-30 RX ADMIN — AZITHROMYCIN 500 MG: 500 INJECTION, POWDER, LYOPHILIZED, FOR SOLUTION INTRAVENOUS at 12:16

## 2021-08-30 RX ADMIN — TAZOBACTAM SODIUM AND PIPERACILLIN SODIUM 4.5 G: 500; 4 INJECTION, SOLUTION INTRAVENOUS at 15:46

## 2021-08-30 RX ADMIN — TAZOBACTAM SODIUM AND PIPERACILLIN SODIUM 4.5 G: 500; 4 INJECTION, SOLUTION INTRAVENOUS at 20:26

## 2021-08-30 RX ADMIN — ALBUTEROL SULFATE 2 PUFF: 90 AEROSOL, METERED RESPIRATORY (INHALATION) at 20:30

## 2021-08-30 RX ADMIN — FINASTERIDE 5 MG: 5 TABLET, FILM COATED ORAL at 09:24

## 2021-08-30 RX ADMIN — LISINOPRIL 10 MG: 10 TABLET ORAL at 09:25

## 2021-08-30 RX ADMIN — SODIUM CHLORIDE, POTASSIUM CHLORIDE, SODIUM LACTATE AND CALCIUM CHLORIDE: 600; 310; 30; 20 INJECTION, SOLUTION INTRAVENOUS at 02:03

## 2021-08-30 RX ADMIN — ALBUTEROL SULFATE 2 PUFF: 90 AEROSOL, METERED RESPIRATORY (INHALATION) at 12:18

## 2021-08-30 RX ADMIN — TAZOBACTAM SODIUM AND PIPERACILLIN SODIUM 4.5 G: 500; 4 INJECTION, SOLUTION INTRAVENOUS at 02:03

## 2021-08-30 RX ADMIN — ENOXAPARIN SODIUM 40 MG: 100 INJECTION SUBCUTANEOUS at 20:26

## 2021-08-30 RX ADMIN — ALBUTEROL SULFATE 2 PUFF: 90 AEROSOL, METERED RESPIRATORY (INHALATION) at 15:50

## 2021-08-30 RX ADMIN — OXYBUTYNIN CHLORIDE 5 MG: 5 TABLET ORAL at 09:24

## 2021-08-30 RX ADMIN — AMLODIPINE BESYLATE 5 MG: 5 TABLET ORAL at 09:25

## 2021-08-30 RX ADMIN — LEVOTHYROXINE SODIUM 88 MCG: 88 TABLET ORAL at 09:24

## 2021-08-30 RX ADMIN — OXYBUTYNIN CHLORIDE 5 MG: 5 TABLET ORAL at 20:26

## 2021-08-30 RX ADMIN — TAMSULOSIN HYDROCHLORIDE 0.4 MG: 0.4 CAPSULE ORAL at 09:24

## 2021-08-30 ASSESSMENT — ACTIVITIES OF DAILY LIVING (ADL)
ADLS_ACUITY_SCORE: 18

## 2021-08-30 NOTE — PLAN OF CARE
"BP (!) 149/76 (BP Location: Right arm)   Pulse 78   Temp 97  F (36.1  C) (Oral)   Resp 16   Ht 1.626 m (5' 4\")   Wt 59.7 kg (131 lb 11.2 oz)   SpO2 96%   BMI 22.61 kg/m    IVF's DC'd today.  Zosyn X couple more doses.  Starting on po omnicef tomorrow.  Pt is unable to do Incentive spirometer.  Unable to comprehend and follow instructions for use.  Difficult time to use albuterol inhaler as well.  Remains on room air.  LS CTA.  Alert and oriented to self only.  Cooperative.  Bed alarm on.  Pt unable to void this shift.  Guzman catheter placed for retention, immediate dark garrett/mack colored urine with sediment and small clots returned.  Respiratory panel swab sent to lab today. Refused breakfast.  Good appetite at lunch.   "

## 2021-08-30 NOTE — PROGRESS NOTES
Antimicrobial Stewardship Team Note     Antimicrobial Stewardship Program - A joint venture between [facility] and  Physicians to optimize antibiotic management.  NOT a formal consult - Restricted Antimicrobial Review      Patient:     81 year old year old male admitted to [facility] system for COVID 8/10/21, discharged on home oxygen and xarelto. PMH of CVA and BPH, with baseline encephalopathy/dementia noted to be significant (worse) during prior hospitalization.    Presented back to outside ER 8/28 with failure to thrive, weakness and vomiting. Got Vanc Zosyn in OSH ER, CT and CXR showed some peribronchial cuffing and interstial infiltrates. Was othersie on home O2, and stable.     Possibly was dehydrated on admission notes indicate improved after IV Fluids.    Labs:  Procalcitonin- <0.05  CRP- 57->31  WBC- 8.5    Imaging:  CXR (8/28) Patchy bilateral perihilar and lower lung infiltrates    Micro:  MRSA Swab (8/29) -ve SA, -ve MecA  Leigonella urine (8/29) -ve  Strep Pneumo urine antigen (8/29) negative    Antimicrobials:     Cefdinir Start 8/30  Zosyn (8/29) present  Azithromycin (8/29) - present      Prior:  Vancomycin x 1 (8/28) outside ER  Cefepime + Zosyn (8/28) outside ER     Assessment:     Patient with recent COVID-19 diagnosis presented back to hospital with ongoing failure to thrive.      Lack of new significant radiological findings, WBC count, O2 requirement or procalcitonin decrease suspicion for typical primary bacterial pulmonary process.    Adding cefdinir does not provide additional spectrum beyond zosyn and only double covers common organisms which may cause CAP. Zosyn in this situation provides antipseudomonal coverage and anaerobic coverage, which in the absence of signficant suspicion for pseudomonas or necrotizing anaerobic lung infection/abscess does not seem to provide additional benefit in this patient.     CXR changes possibly related on resolving COVID pneumonia from prior  episode in the context.    Recommendations:    Presentation not typical for bacterial infection/ pneumonia. Isolated reading of pneumonia does not seem to fit with wider clinical picture (known recent imaging abnormality shanta take time to resolve and other markers oxygen, procal, WBC do not fit picture). Could stop all antibiotics and monitor.    If high suspicion for primary bacterial pulmonary process persists, short course Ceftriaxone or Cefdinir + Azithromycin may be more appropriate- would recommend switch to this regimen if desired today.    Zosyn does not seem to be adding additional benefit in this situation, would recommend to stop Zosyn.    Signed:  Wes Cohn MD , 08/30/21   Infectious Disease PGY-4    Patient discussed at [facility] and [facility] pharmacy stewardship rounds 08/30/21 recommendations reflect consensus of discussion at interdisciplinary rounds.

## 2021-08-30 NOTE — PROGRESS NOTES
"   08/30/21 1100   Quick Adds   Type of Visit Initial PT Evaluation       Present no   Living Environment   People in home child(te), adult;spouse   Current Living Arrangements apartment  (per patient)   Home Accessibility no concerns  (per patient)   Living Environment Comments Patient reports as above, he notes his wife cannot assist and his daughter assists with all cares as well as mobility at baseline. Patient reports sleeping in a hospital bed with bed rails at baseline and using a cane and at walker at times. He denies wheelchair use. He reports a step over tub with grab bars and tub bench, daughter assists with bathing   Self-Care   Current Activity Tolerance fair   Regular Exercise No   Equipment Currently Used at Home walker, standard;cane, straight;grab bar, tub/shower;grab bar, toilet;tub bench   Activity/Exercise/Self-Care Comment on room air with activity 87% bed moility and 93% transfers    Disability/Function   Hearing Difficulty or Deaf no   Wear Glasses or Blind yes   Vision Management glasses   Concentrating, Remembering or Making Decisions Difficulty yes   Concentration Management \"I have been loosing my mind\"   Difficulty Communicating no   Difficulty Eating/Swallowing no   Walking or Climbing Stairs Difficulty yes   Walking or Climbing Stairs ambulation difficulty, requires equipment;stair climbing difficulty, requires equipment;transferring difficulty, requires equipment   Mobility Management walker   Dressing/Bathing Difficulty no   Toileting issues no   Doing Errands Independently Difficulty (such as shopping) no   Errands Management daughter assists   General Information   Onset of Illness/Injury or Date of Surgery 08/28/21   Referring Physician Guera Lambert CNP   Patient/Family Therapy Goals Statement (PT) Go home where I know things   Pertinent History of Current Problem (include personal factors and/or comorbidities that impact the POC) Patient is a 81 year " old male, admitted due to pneumonia and possible ilius. Paitent with a previous medical history of intracranial bleed, BPH, ostepoenia, carotid stenosis, hyperlipidemia, UTI, TIA, encephalopathy.   Existing Precautions/Restrictions fall   Weight-Bearing Status - LUE full weight-bearing   Weight-Bearing Status - RUE full weight-bearing   Weight-Bearing Status - LLE full weight-bearing   Weight-Bearing Status - RLE full weight-bearing   General Observations PT orders: eval and treat weakness, COVID 19. Activity orders: up with assistance   Cognition   Orientation Status (Cognition) oriented to;person  (January 1965, unable to state location, loosing my mind)   Affect/Mental Status (Cognition) confused;low arousal/lethargic;sad/depressed affect   Follows Commands (Cognition) follows two-step commands;50-74% accuracy;delayed response/completion;increased processing time needed;initiation impaired;physical/tactile prompts required;repetition of directions required   Pain Assessment   Patient Currently in Pain No   Integumentary/Edema   Integumentary/Edema Comments diffuse blue hue to knees and shins   Posture    Posture Forward head position;Protracted shoulders   Range of Motion (ROM)   ROM Quick Adds ROM WFL   Strength   Manual Muscle Testing Quick Adds Able to perform R SLR;Able to perform L SLR;Deficits observed during functional mobility   Strength Comments Poor core strength minimal bridge. Poor UE strength for bed mobility and transfers   Bed Mobility   Bed Mobility supine-sit;sit-supine   Supine-Sit [place] (Bed Mobility) moderate assist (50% patient effort)   Sit-Supine [place] (Bed Mobility) supervision   Bed Mobility Limitations cognitive deficits;decreased ability to use legs for bridging/pushing;impaired ability to control trunk for mobility   Impairments Contributing to Impaired Bed Mobility impaired coordination;decreased strength   Assistive Device (Bed Mobility) bed rails  (HOB elevated)    Transfers   Transfers sit-stand transfer;bed-chair transfer   Transfer Safety Concerns Noted decreased balance during turns;losing balance backward;decreased sequencing ability;decreased step length   Impairments Contributing to Impaired Transfers impaired balance;impaired coordination;decreased strength   Bed-Chair Transfer   Bed-Chair [place] (Transfers) minimum assist (75% patient effort);verbal cues   Assistive Device (Bed-Chair Transfers) walker, front-wheeled   Bed/Chair Transfer Comments LOB with turning and poor walker management with navigation   Sit-Stand Transfer   Sit-Stand [place] (Transfers) contact guard   Assistive Device (Sit-Stand Transfers) walker, front-wheeled   Sit/Stand Transfer Comments using bed for leverage   Gait/Stairs (Locomotion)   Comment (Gait/Stairs) unable to progress beyond 4' in room due to safety concerns with transfers   Balance   Balance Comments LOB with transfer x 1 requiring assistance to prevent fall. Heavy reliance upon standing on bed to prevent falling. Walker with poor mechanics, driving at end of reach with poor navigation or busy environment   Sensory Examination   Sensory Perception patient reports no sensory changes   Clinical Impression   Criteria for Skilled Therapeutic Intervention yes, treatment indicated   PT Diagnosis (PT) impaired balance, muscle weakness, impaired mobility   Influenced by the following impairments acute illness, decline in functional status, COVID infection   Functional limitations due to impairments increased risk of falls, physical assistance for bed mobility, requries physical guidance and prompts to increase activity and engage within his environment   Clinical Presentation Stable/Uncomplicated   Clinical Presentation Rationale per chart review congition change is not acute, deconditioned due to infection, medical status, clinical judgement   Clinical Decision Making (Complexity) low complexity   Therapy Frequency (PT) Daily    Predicted Duration of Therapy Intervention (days/wks) 3-4 days   Planned Therapy Interventions (PT) balance training;bed mobility training;gait training;home exercise program;patient/family education;strengthening;transfer training   Anticipated Equipment Needs at Discharge (PT)   (defer to facility)   Risk & Benefits of therapy have been explained evaluation/treatment results reviewed;participants included;patient   Clinical Impression Comments Patient presents with deconditioning, weakness, impaired balance and impaired insight to his deficits. He is from home with family support, however due to impaired cognition is unable to state degree of assistance available. He demonstrates impaired orientation and command following as well as mildly retropulsive sit to stand and fear of anterior weight shift with all transfers. He requires assitance for transfers and mobility and would benefit from TCU placement given the degress of his COVID infection with new pneumonia impacting his activity tolerance, strength and safety.    PT Discharge Planning    PT Discharge Recommendation (DC Rec) Transitional Care Facility  (family transport)   PT Rationale for DC Rec Patient is from home with family support, unclear of degree of assistance for cares and baseline mobility. Currently he requires phsyical guidance, prompts and cues to complete all mobility tasks. He demonstrates impaired orientation and command following as well as mildly retropulsive sit to stand and fear of anterior weight shift with all transfers. He requires assitance for transfers and mobility and would benefit from TCU placement given the degress of his COVID infection with new pneumonia impacting his activity tolerance, strength and safety.    PT Brief overview of current status  MOD assist bed mobility. CGA sit to/from stand and bed to chair transfer with guidance of walker and MOD assist to prevent loss of balance. Onr oom air 87%oxygen sat bed mobility,  93% transfers and 95% at rest.    Total Evaluation Time   Total Evaluation Time (Minutes) 30     Thank you for your referral.  Leann Monae, PT, DPT, ATC, LAT    [facility]  O: [phone]  E: [email or web address]

## 2021-08-30 NOTE — PROGRESS NOTES
[facility]    Medicine Progress Note - Hospitalist Service       Date of Admission:  8/28/2021    Assessment & Plan              Roberto Carlos Arce is a 81 y.o. male with history of intracranial hemorrhage and encephalomalacia, hypertension, BPH, SURENDRA, hypothyroidism and recent COVID-19 infection diagnosed on 8/10/2021 who presented to the ED by EMS for ongoing weakness and vomiting for the last couple days. Patient was initially hospitalized for COVID-19 from August 10-13 and discharged on home care and home oxygen. Family states 2 days ago he started to become weak with vomiting though report his breathing and oxygen level has remained stable. Patient was found to have bilateral pneumonia and transferred from [place] [facility]. He is anticoagulated on Xarelto.       HCAP (healthcare-associated pneumonia)    Acute encephalopathy  Patient presents with weakness, nausea and vomiting and was recently treated for COVID-19 at the hospital at [facility]. He was discharged on home oxygen at [facility]. He presented to the ER with increased confusion, poor oral intake and SOB. Patient has a worsening bilateral pneumonia on X-ray, presumed to be bacterial. Patient was given Vanco and Zosyn. MRSA negative. Pro calcitonin negative. Legionella and Strep Pneumo negative. 8/30: Patient is doing well, off oxygen. Afebrile. No nausea. Tolerating oral intake. Check Viral NP respiratory panel.   Plan:  -  Continue piperacillin/tazobactem today  - Transition to Omnicef tomorrow  - f/u sputum and blood cultures from outside hospital  - trend COVID labs  - unable to give dexamethasone since he has already been treated with it  - Continue Zithromax 500 X 3 days  - Oxygen as needed to maintain saturations.   - Covid antibodies ordered, day 20 of illness      Urinary retention    Benign prostatic hyperplasia with urinary retention    History of 2019 novel coronavirus disease (COVID-19)  Assessment: The  patient was noted to have a distended bladder in the ED and had a urinary cathter placed. He has a history of BPH and urinary retention. Cha was removed and he has not been able to void. Urine culture negative to date.   Plan:  - Replace cha   - Urine culture pending - urine does not appear to be largely infected  - c/w tamsulosin and finasteride as well as oxybutynin  - c/w urinary catheter maintenance  - Patient would need follow up with Urology      Elevated LFTs    Ileus - Possible  Assessment:  The patient has elevated T. Bili. An ultrasound was done that was normal. A CT was then done that showed a possible ileus vs. PUD. Patient with no nausea or pain, tolerating oral intake. Stable at this time.   Plan:  - Monitor    Hyperlipidemia  - c/w rosuvastatin    Hypertension  - c/w lisinopril, amlodipine    Hypothyroidism  - c/w levothyroxine         Diet: Combination Diet Low Saturated Fat Na <2400mg Diet    DVT Prophylaxis: Enoxaparin (Lovenox) SQ  Cha Catheter: Not present  Central Lines: None  Code Status: No CPR- Do NOT Intubate      Disposition Plan   Expected discharge: 08/31/2021   recommended to TCU vs Home once antibiotic plan established, mental status at baseline and safe disposition plan/ TCU bed available.     The patient's care was discussed with the Attending Physician, Dr. Stanton, Bedside Nurse, Care Coordinator/ and Patient.    Guera Lambert, [facility]  Hospitalist Service  [facility]  Securely message with the Vocera Web Console (learn more here)  Text page via [email or web address] Paging/Directory      Clinically Significant Risk Factors Present on Admission               ______________________________________________________________________    Interval History   Patient seen sitting up in bed with no new complaints. Alert and Oriented X 1. Up with nursing assist. Remains stable. Denies pain. Denies shortness of breath and patient is maintaining oxygen  saturations above 90% on room air.  Denies nausea and is tolerating oral intake. Afebrile and hemodynamically stable       Data reviewed today: I reviewed all medications, new labs and imaging results over the last 24 hours.     Physical Exam   Vital Signs: Temp: (!) 96.7  F (35.9  C) Temp src: Oral BP: (!) 143/65 Pulse: 72   Resp: 18 SpO2: 95 % O2 Device: None (Room air)    Weight: 131 lbs 11.2 oz    Constitutional: awake, alert, cooperative, baseline confusion, no apparent distress   Respiratory: No respiratory distress, lungs clear   Cardiovascular:  Normal apical impulse, regular rate and rhythm   GI: Normal bowel sounds, soft, non-distended, non-tender   Skin: normal skin color, texture, turgor  Musculoskeletal: There is no redness, warmth, or swelling of the joints.  Full range of motion noted.    Neurologic: Awake, alert, oriented to name, place and time.   Psychiatric:   No evidence of agitation.  Forgetful.       Data   Recent Labs   Lab 08/30/21  0548 08/29/21  0423 08/29/21  0422   WBC 8.5  --  10.8   HGB 12.0*  --  12.1*   MCV 90  --  90   *  --  185   INR  --  1.43*  --     137  --    POTASSIUM 3.7 4.0  --    CHLORIDE 108 109  --    CO2 24 26  --    BUN 17 22  --    CR 0.79 0.81  --    ANIONGAP 6 2*  --    SERENITY 8.6 8.6  --    GLC 89 119*  --    ALBUMIN  --  2.1*  --    PROTTOTAL  --  5.0*  --    BILITOTAL  --  1.4*  --    ALKPHOS  --  50  --    ALT  --  47  --    AST  --  19  --    TROPONIN  --  <0.015  --

## 2021-08-30 NOTE — PROGRESS NOTES
Care Management Follow Up    Length of Stay (days): 2    Expected Discharge Date: 08/31/2021     Concerns to be Addressed: discharge planning     Patient plan of care discussed at interdisciplinary rounds: Yes    Anticipated Discharge Disposition: Home, Home Care, Transitional Care     Anticipated Discharge Services:    Anticipated Discharge DME:      Patient/family educated on Medicare website which has current facility and service quality ratings:    Education Provided on the Discharge Plan:    Patient/Family in Agreement with the Plan:      Referrals Placed by CM/SW: External Care Coordination  Private pay costs discussed: Not applicable    Additional Information:  [facility] nurseCha [phone] Phone, [phone] Fax, called for an update. Updated that recommendation for discharge is TCU.  Cha requests call on Wednesday with update.     Writer spoke with patient's daughter, Wanda, by phone.  Family is requesting patient discharge home on hospice care.  Family has chosen [facility].  Patient's wife will also be sent home from [facility] with [facility].  Writer will discuss with hospitalist.     REGINALDO Dominguze  [facility] [phone]/ [facility] [phone]  Care Management

## 2021-08-30 NOTE — PLAN OF CARE
Patient is oriented to self and occasionally place. Much more alert today and is able to stay awake during conversation to answer questions. Vitally stable and on room air. Occasionally spo2 will drop to upper 80's when sleeping but typically 94-95%. Zosyn given. Denies pain. Bladder scanned. Then straight cathed for 900ml of dark brown urine. Patient offered multiple attempts to try and void with no success.     Rosario Walker RN on 8/30/2021 at 4:32 AM    Problem: Adult Inpatient Plan of Care  Goal: Plan of Care Review  Outcome: Improving  Goal: Patient-Specific Goal (Individualized)  Outcome: Improving  Goal: Absence of Hospital-Acquired Illness or Injury  Outcome: Improving  Intervention: Identify and Manage Fall Risk  Recent Flowsheet Documentation  Taken 8/30/2021 0400 by Rosario Walker RN  Safety Promotion/Fall Prevention:   bed alarm on   assistive device/personal items within reach   activity supervised   clutter free environment maintained   fall prevention program maintained   increased rounding and observation   increase visualization of patient   patient and family education   room near nurse's station   room organization consistent   safety round/check completed  Taken 8/29/2021 2000 by Rosario Walker RN  Safety Promotion/Fall Prevention:   bed alarm on   assistive device/personal items within reach   activity supervised   clutter free environment maintained   fall prevention program maintained   increased rounding and observation   increase visualization of patient   patient and family education   room near nurse's station   room organization consistent   safety round/check completed  Intervention: Prevent Skin Injury  Recent Flowsheet Documentation  Taken 8/30/2021 0400 by Rosario Walker, RN  Body Position:   heels elevated   legs elevated   turned   right  Taken 8/29/2021 2000 by Rosario Walker RN  Body Position:   heels elevated   legs elevated   turned   right  Intervention:  Prevent and Manage VTE (Venous Thromboembolism) Risk  Recent Flowsheet Documentation  Taken 8/30/2021 0400 by Rosario Walker RN  VTE Prevention/Management: anticoagulant therapy maintained  Taken 8/29/2021 2000 by Rosario Walker RN  VTE Prevention/Management: anticoagulant therapy maintained  Goal: Optimal Comfort and Wellbeing  Outcome: Improving  Goal: Readiness for Transition of Care  Outcome: Improving     Problem: Risk for Delirium  Goal: Optimal Coping  Outcome: Improving  Goal: Improved Behavioral Control  Outcome: Improving  Goal: Improved Attention and Thought Clarity  Outcome: Improving  Goal: Improved Sleep  Outcome: Improving     Problem: Hypertension Comorbidity  Goal: Blood Pressure in Desired Range  Outcome: No Change     Problem: Fluid Imbalance (Pneumonia)  Goal: Fluid Balance  Outcome: Improving     Problem: Infection (Pneumonia)  Goal: Resolution of Infection Signs and Symptoms  Outcome: Improving  Intervention: Prevent Infection Progression  Recent Flowsheet Documentation  Taken 8/30/2021 0400 by Rosario Walker RN  Isolation Precautions:   airborne precautions maintained   contact precautions maintained   droplet precautions maintained  Taken 8/29/2021 2000 by Rosario Walker RN  Isolation Precautions:   airborne precautions maintained   contact precautions maintained   droplet precautions maintained     Problem: Respiratory Compromise (Pneumonia)  Goal: Effective Oxygenation and Ventilation  Outcome: Improving  Intervention: Promote Airway Secretion Clearance  Recent Flowsheet Documentation  Taken 8/30/2021 0400 by Rosario Walker RN  Cough And Deep Breathing: done with encouragement  Taken 8/29/2021 2000 by Rosario Walker RN  Cough And Deep Breathing: done with encouragement  Intervention: Optimize Oxygenation and Ventilation  Recent Flowsheet Documentation  Taken 8/30/2021 0400 by Rosario Walker RN  Head of Bed (HOB) Positioning: HOB at 20 degrees  Taken  8/29/2021 2000 by Rosario Walker, RN  Head of Bed (HOB) Positioning: HOB at 20 degrees     Problem: Discharge Planning  Goal: Discharge Planning (Adult, OB, Behavioral, Peds)  Outcome: Improving

## 2021-08-31 ENCOUNTER — APPOINTMENT (OUTPATIENT)
Dept: OCCUPATIONAL THERAPY | Facility: CLINIC | Age: 81
DRG: 194 | End: 2021-08-31
Attending: INTERNAL MEDICINE
Payer: COMMERCIAL

## 2021-08-31 LAB
ANION GAP SERPL CALCULATED.3IONS-SCNC: 2 MMOL/L (ref 3–14)
BUN SERPL-MCNC: 14 MG/DL (ref 7–30)
CALCIUM SERPL-MCNC: 8.5 MG/DL (ref 8.5–10.1)
CHLORIDE BLD-SCNC: 107 MMOL/L (ref 94–109)
CO2 SERPL-SCNC: 30 MMOL/L (ref 20–32)
CREAT SERPL-MCNC: 0.94 MG/DL (ref 0.66–1.25)
CRP SERPL-MCNC: 32.5 MG/L (ref 0–8)
D DIMER PPP FEU-MCNC: 0.61 UG/ML FEU (ref 0–0.5)
ERYTHROCYTE [DISTWIDTH] IN BLOOD BY AUTOMATED COUNT: 13.2 % (ref 10–15)
FIBRINOGEN PPP-MCNC: 538 MG/DL (ref 170–490)
GFR SERPL CREATININE-BSD FRML MDRD: 75 ML/MIN/1.73M2
GLUCOSE BLD-MCNC: 84 MG/DL (ref 70–99)
HCT VFR BLD AUTO: 31.3 % (ref 40–53)
HGB BLD-MCNC: 10.7 G/DL (ref 13.3–17.7)
MAGNESIUM SERPL-MCNC: 1.8 MG/DL (ref 1.6–2.3)
MCH RBC QN AUTO: 30.9 PG (ref 26.5–33)
MCHC RBC AUTO-ENTMCNC: 34.2 G/DL (ref 31.5–36.5)
MCV RBC AUTO: 91 FL (ref 78–100)
PLATELET # BLD AUTO: 128 10E3/UL (ref 150–450)
POTASSIUM BLD-SCNC: 3.9 MMOL/L (ref 3.4–5.3)
RBC # BLD AUTO: 3.46 10E6/UL (ref 4.4–5.9)
S PNEUM AG SPEC QL: NEGATIVE
SARS-COV-2 AB SERPL IA-ACNC: 38 U/ML
SARS-COV-2 AB SERPL QL IA: POSITIVE
SODIUM SERPL-SCNC: 139 MMOL/L (ref 133–144)
WBC # BLD AUTO: 6.1 10E3/UL (ref 4–11)

## 2021-08-31 PROCEDURE — 80048 BASIC METABOLIC PNL TOTAL CA: CPT | Performed by: NURSE PRACTITIONER

## 2021-08-31 PROCEDURE — 85384 FIBRINOGEN ACTIVITY: CPT | Performed by: INTERNAL MEDICINE

## 2021-08-31 PROCEDURE — 86140 C-REACTIVE PROTEIN: CPT | Performed by: INTERNAL MEDICINE

## 2021-08-31 PROCEDURE — 999N000147 HC STATISTIC PT IP EVAL DEFER: Performed by: PHYSICAL THERAPIST

## 2021-08-31 PROCEDURE — 97166 OT EVAL MOD COMPLEX 45 MIN: CPT | Mod: GO

## 2021-08-31 PROCEDURE — 99232 SBSQ HOSP IP/OBS MODERATE 35: CPT | Performed by: NURSE PRACTITIONER

## 2021-08-31 PROCEDURE — 99207 PR CDG-MDM COMPONENT: MEETS MODERATE - UP CODED: CPT | Performed by: NURSE PRACTITIONER

## 2021-08-31 PROCEDURE — 250N000011 HC RX IP 250 OP 636: Performed by: INTERNAL MEDICINE

## 2021-08-31 PROCEDURE — 36415 COLL VENOUS BLD VENIPUNCTURE: CPT | Performed by: NURSE PRACTITIONER

## 2021-08-31 PROCEDURE — 85014 HEMATOCRIT: CPT | Performed by: INTERNAL MEDICINE

## 2021-08-31 PROCEDURE — 83735 ASSAY OF MAGNESIUM: CPT | Performed by: NURSE PRACTITIONER

## 2021-08-31 PROCEDURE — 250N000013 HC RX MED GY IP 250 OP 250 PS 637: Performed by: NURSE PRACTITIONER

## 2021-08-31 PROCEDURE — 85379 FIBRIN DEGRADATION QUANT: CPT | Performed by: INTERNAL MEDICINE

## 2021-08-31 PROCEDURE — 120N000001 HC R&B MED SURG/OB

## 2021-08-31 RX ORDER — CEFDINIR 300 MG/1
300 CAPSULE ORAL EVERY 12 HOURS
Qty: 16 CAPSULE | Refills: 0 | Status: SHIPPED | OUTPATIENT
Start: 2021-08-31 | End: 2021-09-01

## 2021-08-31 RX ORDER — AMLODIPINE BESYLATE 5 MG/1
5 TABLET ORAL DAILY
COMMUNITY
Start: 2021-08-31

## 2021-08-31 RX ORDER — LISINOPRIL 10 MG/1
10 TABLET ORAL DAILY
COMMUNITY
Start: 2021-08-31

## 2021-08-31 RX ADMIN — OXYBUTYNIN CHLORIDE 5 MG: 5 TABLET ORAL at 17:23

## 2021-08-31 RX ADMIN — CEFDINIR 300 MG: 300 CAPSULE ORAL at 09:11

## 2021-08-31 RX ADMIN — CEFDINIR 300 MG: 300 CAPSULE ORAL at 21:15

## 2021-08-31 RX ADMIN — OXYBUTYNIN CHLORIDE 5 MG: 5 TABLET ORAL at 09:11

## 2021-08-31 RX ADMIN — ENOXAPARIN SODIUM 40 MG: 100 INJECTION SUBCUTANEOUS at 21:15

## 2021-08-31 RX ADMIN — TAMSULOSIN HYDROCHLORIDE 0.4 MG: 0.4 CAPSULE ORAL at 09:11

## 2021-08-31 RX ADMIN — ALBUTEROL SULFATE 2 PUFF: 90 AEROSOL, METERED RESPIRATORY (INHALATION) at 17:22

## 2021-08-31 RX ADMIN — ALBUTEROL SULFATE 2 PUFF: 90 AEROSOL, METERED RESPIRATORY (INHALATION) at 21:21

## 2021-08-31 RX ADMIN — ALBUTEROL SULFATE 2 PUFF: 90 AEROSOL, METERED RESPIRATORY (INHALATION) at 09:11

## 2021-08-31 RX ADMIN — FINASTERIDE 5 MG: 5 TABLET, FILM COATED ORAL at 09:11

## 2021-08-31 RX ADMIN — OXYBUTYNIN CHLORIDE 5 MG: 5 TABLET ORAL at 21:15

## 2021-08-31 RX ADMIN — LEVOTHYROXINE SODIUM 88 MCG: 88 TABLET ORAL at 09:11

## 2021-08-31 ASSESSMENT — MIFFLIN-ST. JEOR: SCORE: 1205.2

## 2021-08-31 ASSESSMENT — ACTIVITIES OF DAILY LIVING (ADL)
ADLS_ACUITY_SCORE: 18

## 2021-08-31 NOTE — PROGRESS NOTES
Care Management Follow Up    Length of Stay (days): 3    Expected Discharge Date: 9/1/21     Concerns to be Addressed: discharge planning     Patient plan of care discussed at interdisciplinary rounds: Yes    Anticipated Discharge Disposition: Home, Home Care - Resume [facility] [phone] Phone, [phone] Fax - RN     Anticipated Discharge Services:  [facility] consult at home    Anticipated Discharge DME:      Patient/family educated on Medicare website which has current facility and service quality ratings:    Education Provided on the Discharge Plan:      Patient/Family in Agreement with the Plan:  Yes    Referrals Placed by CM/SW: External Care Coordination    Private pay costs discussed: Not applicable    Additional Information:  Per hospitalist...conversation with hospitalist and daughter, Wanda.  Family plans to take patient home tomorrow.  Family requests a referral be placed to [facility] to assess if patient qualified for hospice services.  Call out to Wanda to see if she wants resumption of care orders placed at discharge for [facility] in case patient does not qualify for hospice.  Family transport home tomorrow.    Update 1620 - Writer spoke with patient's daughter Wanda.  Wanda chooses to resume [facility] [phone] Phone, [phone] Fax - RN, until patient is signed on with [facility].  Hospitalist notified and will add to discharge orders.    REGINALDO Dominguez  [facility] [phone]/ [place] [phone]  Care Management

## 2021-08-31 NOTE — PROGRESS NOTES
S-(situation): Initial Occupational Therapy Evaluation     B-(background): Roberto Carlos is an 81 year old male, admitted due to pneumonia and possible ilius. Paitent with a previous medical history of intracranial bleed, BPH, ostepoenia, carotid stenosis, hyperlipidemia, UTI, TIA, encephalopathy. OT orders: evaluate and treat as indicated; confusion and dementia.     A-(assessment): After morning rounding and discussion with SW, pt requesting hospice upon discharge. Due to change in POC, occupational therapy orders will be completed at this time.     R-(recommendations): Complete inpatient occupational therapy orders    Thank you for the referral.  Yanira Majnao, OTR/L

## 2021-08-31 NOTE — PROGRESS NOTES
"   08/31/21 1000   Quick Adds   Type of Visit Initial Occupational Therapy Evaluation       Present no   Living Environment   People in home child(te), adult;spouse   Current Living Arrangements apartment  (per patient )   Home Accessibility no concerns   Transportation Anticipated agency;family or friend will provide   Self-Care   Usual Activity Tolerance fair   Current Activity Tolerance fair   Regular Exercise No   Equipment Currently Used at Home walker, standard;cane, straight;grab bar, tub/shower;grab bar, toilet;tub bench   Activity/Exercise/Self-Care Comment room air with 88% during bed mobility   Instrumental Activities of Daily Living (IADL)   IADL Comments daughter assists with IALDs per report   Disability/Function   Hearing Difficulty or Deaf no   Wear Glasses or Blind yes   Vision Management glasses   Concentrating, Remembering or Making Decisions Difficulty yes   Concentration Management \"nothing:   Difficulty Communicating no   Difficulty Eating/Swallowing no   Walking or Climbing Stairs Difficulty yes   Walking or Climbing Stairs ambulation difficulty, requires equipment;stair climbing difficulty, requires equipment;transferring difficulty, requires equipment   Mobility Management walker/cane/wc per report   Dressing/Bathing Difficulty no   Toileting issues no   Doing Errands Independently Difficulty (such as shopping) no   Errands Management daughter assists   Fall history within last six months no   Change in Functional Status Since Onset of Current Illness/Injury no   General Information   Onset of Illness/Injury or Date of Surgery 08/30/21   Referring Physician Guera Lambert, CNP   Patient/Family Therapy Goal Statement (OT) home with wife   Left Upper Extremity (Weight-bearing Status) full weight-bearing (FWB)   Right Upper Extremity (Weight-bearing Status) full weight-bearing (FWB)   Left Lower Extremity (Weight-bearing Status) full weight-bearing (FWB)   Right " Lower Extremity (Weight-bearing Status) full weight-bearing (FWB)   General Observations and Info Patient is a 81 year old male, admitted due to pneumonia and possible ilius. Paitent with a previous medical history of intracranial bleed, BPH, ostepoenia, carotid stenosis, hyperlipidemia, UTI, TIA, encephalopathy.   Cognitive Status Examination   Orientation Status person  ( )   Affect/Mental Status (Cognitive) confused;flat/blunted affect   Follows Commands follows two-step commands   Safety Deficit severe deficit   Cognitive Status Comments Pt scored 7/30 on the SLUMs this date; confused on time/place. 7/30 score indicative of severe neurocognitive deficits. Pt would require / assistance for saftey in home enviorment. Pt able to follow all commands and directions given by therapist, however, demonstrates signifcant increased time for processing speed as well as signs of inattention and impulsve behaviors.    Visual Perception   Visual Impairment/Limitations WNL   Sensory   Sensory Quick Adds No deficits were identified   Pain Assessment   Patient Currently in Pain No   Integumentary/Edema   Integumentary/Edema no deficits were identifed   Posture   Posture not impaired   Range of Motion Comprehensive   General Range of Motion no range of motion deficits identified   Strength Comprehensive (MMT)   Comment, General Manual Muscle Testing (MMT) Assessment globalized weakness noted    Muscle Tone Assessment   Muscle Tone Quick Adds No deficits were identified   Coordination   Upper Extremity Coordination No deficits were identified   Gross Motor Coordination No deficits were identified   Fine Motor Coordination No deficits were identified   Clinical Impression   Criteria for Skilled Therapeutic Interventions Met (OT) yes;skilled treatment is necessary   OT Diagnosis Impaired cognition, impaired ADLS compared to baseline    OT Problem List-Impairments impacting ADL problems related to;activity tolerance  impaired;cognition;inability to direct their own care;strength   ADL comments/analysis Refer above for cognitive testing completed this date   Assessment of Occupational Performance 3-5 Performance Deficits   Planned Therapy Interventions (OT) ADL retraining;strengthening;transfer training;progressive activity/exercise;risk factor education;home program guidelines   Intervention Comments Further evaluation to determine functional IND with ADLs; home modifications    Clinical Decision Making Complexity (OT) moderate complexity   Therapy Frequency (OT) Daily   Predicted Duration of Therapy 1-2 days   Risk & Benefits of therapy have been explained evaluation/treatment results reviewed;care plan/treatment goals reviewed;risks/benefits reviewed;current/potential barriers reviewed;patient   OT Discharge Planning    OT Discharge Recommendation (DC Rec) Transitional Care Facility  (family transport )   OT Rationale for DC Rec Pt from apartment with spouse and daughter. Per chart review, daughter and wife unable to provide level of assistance required for returned baseline IND with ADLs. Based on SLUMS score pt would require 24/7 supervision (scored 7/30 on SLUMS) due to severe neurocognitive deficit. Pt would benefit from TCU to address functional cognition, strength, and further assess IND with ADLS.    Total Evaluation Time (Minutes)   Total Evaluation Time (Minutes) 25       Thank you for the referral.   Yanira Majano OTR/L

## 2021-08-31 NOTE — PLAN OF CARE
"/62 (BP Location: Right arm)   Pulse 70   Temp 97.5  F (36.4  C) (Oral)   Resp 16   Ht 1.626 m (5' 4\")   Wt 59.7 kg (131 lb 11.2 oz)   SpO2 93%   BMI 22.61 kg/m    Pt denies pain.  Tolerating diet.  Appetite good.  Guzman catheter with adequate urine output.  Urine dark, brown/pink with sediment.  Zithromax discontinued today and started on po omnicef.    "

## 2021-08-31 NOTE — PROGRESS NOTES
4hr  Shift note  Pt confused at baseline. Urine pink/red/with clots/sediment observed. Guzman patent and draining well. See flow sheets for I&O. Zosyn given for abx this shift x1. No pain reported. Will continue to monitor POC.

## 2021-08-31 NOTE — PLAN OF CARE
S-(situation): Physical therapy treatment per plan of care    B-(background): Patient is a 81 year old male, admitted due to pneumonia and possible ilius. Paitent with a previous medical history of intracranial bleed, BPH, ostepoenia, carotid stenosis, hyperlipidemia, UTI, TIA, encephalopathy.    A-(assessment): Patient with change in discharge plan. Due to plan to discharge home patient's frequency of treatment decreased to 3x/week.     R-(recommendations): Patient to continue mobilizing with nursing and progressively increase time out of bed. PT to complete treatment 9/1/21.    Thank you for your referral.  Leann Monae, PT, DPT, ATC, [facility]  O: [phone]  E: [email or web address]

## 2021-08-31 NOTE — PLAN OF CARE
VSS on RA.  Pt denies pain.  Turning and repositioning side to side with staff assistance, tolerating turning activity.  Weak cough, encouraging deep breathing.  Guzman draining red tinged urine with sediment.  Cath cares completed.      Problem: Adult Inpatient Plan of Care  Goal: Patient-Specific Goal (Individualized)  Outcome: No Change  Flowsheets (Taken 8/31/2021 0605)  Patient-Specific Goals (Include Timeframe): Pt will tolerate increased activity by 8/31/21  Goal: Absence of Hospital-Acquired Illness or Injury  Intervention: Identify and Manage Fall Risk  Recent Flowsheet Documentation  Taken 8/31/2021 0027 by Felicity Horn RN  Safety Promotion/Fall Prevention:   activity supervised   bed alarm on   assistive device/personal items within reach   clutter free environment maintained   fall prevention program maintained  Intervention: Prevent and Manage VTE (Venous Thromboembolism) Risk  Recent Flowsheet Documentation  Taken 8/31/2021 0027 by Felicity Horn RN  VTE Prevention/Management: anticoagulant therapy adjusted     Problem: Hypertension Comorbidity  Goal: Blood Pressure in Desired Range  Intervention: Maintain Blood Pressure Management  Recent Flowsheet Documentation  Taken 8/31/2021 0027 by Felicity Horn RN  Medication Review/Management: medications reviewed     Problem: Infection (Pneumonia)  Goal: Resolution of Infection Signs and Symptoms  Intervention: Prevent Infection Progression  Recent Flowsheet Documentation  Taken 8/31/2021 0027 by Felicity Horn RN  Isolation Precautions: (special covid) other (see comments)     Problem: Respiratory Compromise (Pneumonia)  Goal: Effective Oxygenation and Ventilation  Intervention: Promote Airway Secretion Clearance  Recent Flowsheet Documentation  Taken 8/31/2021 0027 by Felicity Horn RN  Cough And Deep Breathing: done with encouragement

## 2021-08-31 NOTE — PROGRESS NOTES
[facility]    Medicine Progress Note - Hospitalist Service       Date of Admission:  8/28/2021    Assessment & Plan           Roberto Carlos Arce is a 81 y.o. male with history of intracranial hemorrhage and encephalomalacia, hypertension, BPH, SURENDRA, hypothyroidism and recent COVID-19 infection diagnosed on 8/10/2021 who presented to the ED by EMS for ongoing weakness and vomiting for the last couple days. Patient was initially hospitalized for COVID-19 from August 10-13 and discharged on home care and home oxygen. Family states 2 days ago he started to become weak with vomiting though report his breathing and oxygen level has remained stable. Patient was found to have bilateral pneumonia and transferred from [place] ER. He is anticoagulated on Xarelto.       HCAP (healthcare-associated pneumonia)    Acute encephalopathy  Patient presents with weakness, nausea and vomiting and was recently treated for COVID-19 at the hospital at [facility]. He was discharged on home oxygen at [facility]. He presented to the ER with increased confusion, poor oral intake and SOB. Patient has a worsening bilateral pneumonia on X-ray, presumed to be bacterial. Patient was given Vanco and Zosyn. MRSA negative. Pro calcitonin negative. Legionella and Strep Pneumo negative. 8/31: Patient is doing well, off oxygen. Afebrile. No nausea. Tolerating oral intake.  Viral NP respiratory panel negative.   Plan:  - Continue Omnicef   - f/u sputum and blood cultures from outside hospital  - trend COVID labs  - unable to give dexamethasone since he has already been treated with it  - Continue Zithromax 500 X 3 days - complete  - Oxygen as needed to maintain saturations.   - Covid antibodies ordered, day 21 of illness  - Plan for discharge 9/1/21, family aware.  - Hospice referral requested, this was ordered  - Discharge orders prepped      Urinary retention    Benign prostatic hyperplasia with urinary  retention    History of 2019 novel coronavirus disease (COVID-19)  Assessment: The patient was noted to have a distended bladder in the ED and had a urinary cathter placed. He has a history of BPH and urinary retention. Cha was removed and he has not been able to void. Urine culture negative to date.   Plan:  - Replace cha 8/30  - Urine culture negative  - c/w tamsulosin and finasteride as well as oxybutynin  - c/w urinary catheter maintenance  - Patient would need follow up with Urology  - Plan to discharge with cha in place      Elevated LFTs    Ileus - Possible  Assessment:  The patient has elevated T. Bili. An ultrasound was done that was normal. A CT was then done that showed a possible ileus vs. PUD. Patient with no nausea or pain, tolerating oral intake. Stable at this time. Bilirubin stable at 1.4, other hepatic labs normal.   Plan:  - Monitor    Hyperlipidemia  - c/w rosuvastatin    Hypertension  - c/w lisinopril, amlodipine    Hypothyroidism  - c/w levothyroxine           Diet: Combination Diet Low Saturated Fat Na <2400mg Diet  Diet    DVT Prophylaxis: Enoxaparin (Lovenox) SQ  Cha Catheter: PRESENT, indication: Retention, Retention  Central Lines: None  Code Status: No CPR- Do NOT Intubate      Disposition Plan   Expected discharge: 08/31/2021   recommended to prior living arrangement once antibiotic plan established.     The patient's care was discussed with the Attending Physician, Dr. Caballero, Bedside Nurse, Care Coordinator/, Patient and Patient's Family.    Guera Lambert, [facility]  Hospitalist Service  [facility]  Securely message with the Vocera Web Console (learn more here)  Text page via [facility] Paging/Directory      Clinically Significant Risk Factors Present on Admission               ______________________________________________________________________    Interval History   Patient seen sitting up in bed with no new complaints. Alert and  Oriented X 1-2. Up with nursing assist, 1- CGA. Remains stable. Denies pain. Denies shortness of breath and patient is maintaining oxygen saturations above 90% on room air.  Denies nausea and is tolerating oral intake. Afebrile and hemodynamically stable       Data reviewed today: I reviewed all medications, new labs and imaging results over the last 24 hours.    Physical Exam   Vital Signs: Temp: 98.1  F (36.7  C) Temp src: Oral BP: 114/55 Pulse: 56   Resp: 16 SpO2: 94 % O2 Device: None (Room air)    Weight: 131 lbs 11.2 oz  Constitutional: awake, alert, cooperative, baseline confusion, no apparent distress   Respiratory: No respiratory distress, lungs clear   Cardiovascular:  Normal apical impulse, regular rate and rhythm   GI: Normal bowel sounds, soft, non-distended, non-tender   Skin: normal skin color, texture, turgor  Musculoskeletal: There is no redness, warmth, or swelling of the joints.  Full range of motion noted.    Neurologic: Awake, alert, oriented to name, place and time.   Psychiatric:   No evidence of agitation.  Forgetful.     Data   Recent Labs   Lab 08/31/21  0812 08/30/21  0548 08/29/21  0423 08/29/21  0422   WBC 6.1 8.5  --  10.8   HGB 10.7* 12.0*  --  12.1*   MCV 91 90  --  90   * 149*  --  185   INR  --   --  1.43*  --     138 137  --    POTASSIUM 3.9 3.7 4.0  --    CHLORIDE 107 108 109  --    CO2 30 24 26  --    BUN 14 17 22  --    CR 0.94 0.79 0.81  --    ANIONGAP 2* 6 2*  --    SERENITY 8.5 8.6 8.6  --    GLC 84 89 119*  --    ALBUMIN  --  2.1* 2.1*  --    PROTTOTAL  --  5.2* 5.0*  --    BILITOTAL  --  1.4* 1.4*  --    ALKPHOS  --  52 50  --    ALT  --  57 47  --    AST  --  37 19  --    TROPONIN  --   --  <0.015  --

## 2021-09-01 ENCOUNTER — APPOINTMENT (OUTPATIENT)
Dept: PHYSICAL THERAPY | Facility: CLINIC | Age: 81
DRG: 194 | End: 2021-09-01
Attending: INTERNAL MEDICINE
Payer: COMMERCIAL

## 2021-09-01 VITALS
HEIGHT: 64 IN | SYSTOLIC BLOOD PRESSURE: 107 MMHG | BODY MASS INDEX: 22.55 KG/M2 | HEART RATE: 59 BPM | RESPIRATION RATE: 16 BRPM | DIASTOLIC BLOOD PRESSURE: 54 MMHG | OXYGEN SATURATION: 94 % | TEMPERATURE: 98.7 F | WEIGHT: 132.1 LBS

## 2021-09-01 LAB
ANION GAP SERPL CALCULATED.3IONS-SCNC: 3 MMOL/L (ref 3–14)
BUN SERPL-MCNC: 13 MG/DL (ref 7–30)
CALCIUM SERPL-MCNC: 8.7 MG/DL (ref 8.5–10.1)
CHLORIDE BLD-SCNC: 109 MMOL/L (ref 94–109)
CO2 SERPL-SCNC: 27 MMOL/L (ref 20–32)
CREAT SERPL-MCNC: 0.7 MG/DL (ref 0.66–1.25)
CRP SERPL-MCNC: 19 MG/L (ref 0–8)
D DIMER PPP FEU-MCNC: 0.46 UG/ML FEU (ref 0–0.5)
ERYTHROCYTE [DISTWIDTH] IN BLOOD BY AUTOMATED COUNT: 13.1 % (ref 10–15)
FIBRINOGEN PPP-MCNC: 549 MG/DL (ref 170–490)
GFR SERPL CREATININE-BSD FRML MDRD: 87 ML/MIN/1.73M2
GLUCOSE BLD-MCNC: 91 MG/DL (ref 70–99)
HCT VFR BLD AUTO: 32.3 % (ref 40–53)
HGB BLD-MCNC: 11.1 G/DL (ref 13.3–17.7)
MCH RBC QN AUTO: 30.8 PG (ref 26.5–33)
MCHC RBC AUTO-ENTMCNC: 34.4 G/DL (ref 31.5–36.5)
MCV RBC AUTO: 90 FL (ref 78–100)
PLATELET # BLD AUTO: 138 10E3/UL (ref 150–450)
POTASSIUM BLD-SCNC: 3.6 MMOL/L (ref 3.4–5.3)
RBC # BLD AUTO: 3.6 10E6/UL (ref 4.4–5.9)
SODIUM SERPL-SCNC: 139 MMOL/L (ref 133–144)
WBC # BLD AUTO: 5.3 10E3/UL (ref 4–11)

## 2021-09-01 PROCEDURE — 250N000013 HC RX MED GY IP 250 OP 250 PS 637: Performed by: NURSE PRACTITIONER

## 2021-09-01 PROCEDURE — 85384 FIBRINOGEN ACTIVITY: CPT | Performed by: INTERNAL MEDICINE

## 2021-09-01 PROCEDURE — 99239 HOSP IP/OBS DSCHRG MGMT >30: CPT | Performed by: NURSE PRACTITIONER

## 2021-09-01 PROCEDURE — 80048 BASIC METABOLIC PNL TOTAL CA: CPT | Performed by: NURSE PRACTITIONER

## 2021-09-01 PROCEDURE — 97530 THERAPEUTIC ACTIVITIES: CPT | Mod: GP | Performed by: PHYSICAL THERAPIST

## 2021-09-01 PROCEDURE — 85379 FIBRIN DEGRADATION QUANT: CPT | Performed by: INTERNAL MEDICINE

## 2021-09-01 PROCEDURE — 36415 COLL VENOUS BLD VENIPUNCTURE: CPT | Performed by: INTERNAL MEDICINE

## 2021-09-01 PROCEDURE — 86140 C-REACTIVE PROTEIN: CPT | Performed by: INTERNAL MEDICINE

## 2021-09-01 PROCEDURE — 85027 COMPLETE CBC AUTOMATED: CPT | Performed by: INTERNAL MEDICINE

## 2021-09-01 RX ORDER — CEFDINIR 300 MG/1
300 CAPSULE ORAL EVERY 12 HOURS
Qty: 16 CAPSULE | Refills: 0 | Status: SHIPPED | OUTPATIENT
Start: 2021-09-01

## 2021-09-01 RX ADMIN — FINASTERIDE 5 MG: 5 TABLET, FILM COATED ORAL at 09:37

## 2021-09-01 RX ADMIN — ALBUTEROL SULFATE 2 PUFF: 90 AEROSOL, METERED RESPIRATORY (INHALATION) at 13:19

## 2021-09-01 RX ADMIN — ALBUTEROL SULFATE 2 PUFF: 90 AEROSOL, METERED RESPIRATORY (INHALATION) at 09:39

## 2021-09-01 RX ADMIN — CEFDINIR 300 MG: 300 CAPSULE ORAL at 09:37

## 2021-09-01 RX ADMIN — TAMSULOSIN HYDROCHLORIDE 0.4 MG: 0.4 CAPSULE ORAL at 09:36

## 2021-09-01 RX ADMIN — LEVOTHYROXINE SODIUM 88 MCG: 88 TABLET ORAL at 09:36

## 2021-09-01 RX ADMIN — OXYBUTYNIN CHLORIDE 5 MG: 5 TABLET ORAL at 09:36

## 2021-09-01 RX ADMIN — OXYBUTYNIN CHLORIDE 5 MG: 5 TABLET ORAL at 13:18

## 2021-09-01 ASSESSMENT — ACTIVITIES OF DAILY LIVING (ADL)
ADLS_ACUITY_SCORE: 18
ADLS_ACUITY_SCORE: 18
ADLS_ACUITY_SCORE: 20
ADLS_ACUITY_SCORE: 18
ADLS_ACUITY_SCORE: 20

## 2021-09-01 NOTE — PROVIDER NOTIFICATION
MD at 1056 AM regarding new orders.      Spoke with: Alex    Orders were not obtained.    Comments: Telephone order from Dr Alex to place chest tube to water seal and she will assess this afternoon.

## 2021-09-01 NOTE — PROGRESS NOTES
Occupational Therapy Discharge Summary    Reason for therapy discharge:    Discharged to home with home therapy.    Progress towards therapy goal(s). See goals on Care Plan in [facility] electronic health record for goal details.  Goals partially met.  Barriers to achieving goals:   limited tolerance for therapy.    Therapy recommendation(s):    Continued therapy is recommended.  Rationale/Recommendations:  to address functional independence with ADLS and saftey with home enviorment .     Thank you for the referral.   Yanira Majano OTR/L

## 2021-09-01 NOTE — PROGRESS NOTES
Care Management Discharge Note    Discharge Date: 09/01/2021       Discharge Disposition: Home, hospice     Discharge Services:  [facility]     Discharge DME:  Hospice to order hospital bed, commode, tray table, lift and home 02.     Discharge Transportation: Non-emergent stretcher transport     Private pay costs discussed: transportation costs, discussed possible private pay cost w/ stretcher transport     PAS Confirmation Code:  N/a   Patient/family educated on Medicare website which has current facility and service quality ratings:      Education Provided on the Discharge Plan:  Yes   Persons Notified of Discharge Plans: Wanda Meeks   Patient/Family in Agreement with the Plan:  Yes     Handoff Referral Completed: Yes    Additional Information:    Patient is medically ready to discharge today. Writer had long discussion w/ Wanda meeks re: plan. Family/patient have decided to enroll w/ [facility] this evening.     9:30am- received call from Shefali from [facility]. Shefali states team has concerns re: patient getting into the home and the ability of family to care for him 24/7. [facility] has personally met and been in patients home as [facility] enrolled patients spouse on 8/31. Per discussion w/ Wanda, family is in the process of making arrangements to have a family member present 24/7. One dtr does live w/ patient and is available to be w/ patient overnight. Wanda is reaching out to their local [employer] in [place] to see if any [employer] members are able to provide a couple hours a week to be with patient. Per Wanda, the [employer] has been offering their help. In addition, drewr provided resources for private pay home care services.     Per medical team and hospice patient is not appropriate to be transported to home in a personal vehicle d/t the level of assist pt requires. Writer did discuss recommendation of non-emergent stretcher transportation w/ Wanda who is agreeable. Wanda states that  transporting him before his hospitalization was increasingly difficult. Writer did explain potential for private pay cost of stretcher transport in the event his insurance did not cover. Wanda expressed her understanding.     10:30- writer spoke w /Shefali to confirm discharge plans and the above. [facility] will be out to patients home late afternoon/early evening.     PLAN: Discharge to home today @ 2pm via non-emergent stretcher w/ enrollment w/ [facility] this evening.     Handoff completed.         REGINALDO Recinos  Care Management  [phone]

## 2021-09-01 NOTE — PLAN OF CARE
"Patient is A&O to self and location pt states: \"hospital\". Follows commands well and is very pleasant. Guzman in place- urine has a slight pink color with sediment. Output has been well. Guzman cares done. Blood pressures have been 107/67 and 89/41. O2 sats have been 94-95% on room air. He is resting comfortably. Plan is to discharge to home with family today. Foam pad on coccyx was changed- we cleaned the wounds with normal saline. Patient states he has no pain. Will continue to monitor VS, output, mentation, and pain.    Problem: Adult Inpatient Plan of Care  Goal: Plan of Care Review  Outcome: Improving  Flowsheets (Taken 8/31/2021 1811 by Pipkin, Jaime, RN)  Plan of Care Reviewed With: patient  Goal: Patient-Specific Goal (Individualized)  Outcome: Improving  Flowsheets (Taken 9/1/2021 0457)  Patient-Specific Goals (Include Timeframe): Patient will tolerate increased activity by 9/2/21  Goal: Absence of Hospital-Acquired Illness or Injury  Intervention: Identify and Manage Fall Risk  Recent Flowsheet Documentation  Taken 8/31/2021 2121 by Saray Alston RN  Safety Promotion/Fall Prevention:   activity supervised   assistive device/personal items within reach   bed alarm on   clutter free environment maintained   fall prevention program maintained   nonskid shoes/slippers when out of bed  Intervention: Prevent Skin Injury  Recent Flowsheet Documentation  Taken 8/31/2021 2121 by Saray Alston RN  Body Position:   left   turned  Intervention: Prevent and Manage VTE (Venous Thromboembolism) Risk  Recent Flowsheet Documentation  Taken 8/31/2021 2124 by Saray Alston RN  VTE Prevention/Management: anticoagulant therapy maintained  Taken 8/31/2021 2121 by Saray Alston RN  VTE Prevention/Management: anticoagulant therapy maintained     Problem: Hypertension Comorbidity  Goal: Blood Pressure in Desired Range  Intervention: Maintain Blood Pressure Management  Recent Flowsheet Documentation  Taken " 8/31/2021 2121 by Saray Alston RN  Medication Review/Management: medications reviewed     Problem: Infection (Pneumonia)  Goal: Resolution of Infection Signs and Symptoms  Intervention: Prevent Infection Progression  Recent Flowsheet Documentation  Taken 8/31/2021 2121 by Saray Alston RN  Isolation Precautions: (covid precautions) other (see comments)     Problem: Respiratory Compromise (Pneumonia)  Goal: Effective Oxygenation and Ventilation  Intervention: Promote Airway Secretion Clearance  Recent Flowsheet Documentation  Taken 8/31/2021 2124 by Saray Alston RN  Cough And Deep Breathing: done with encouragement  Intervention: Optimize Oxygenation and Ventilation  Recent Flowsheet Documentation  Taken 8/31/2021 2121 by Saray Alston RN  Head of Bed (HOB) Positioning: HOB at 20-30 degrees

## 2021-09-01 NOTE — PLAN OF CARE
Physical Therapy Discharge Summary    Reason for therapy discharge:    Discharged to home with home therapy.    Progress towards therapy goal(s). See goals on Care Plan in [facility] electronic health record for goal details.  Goals partially met.  Barriers to achieving goals:   limited tolerance for therapy and discharge from facility.    Therapy recommendation(s):    Continued therapy is recommended.  Rationale/Recommendations:  to address muscle weakness, impaired endurance, impaired balance for greater safety in the home environment..     Thank you for your referral.  Leann Monae, PT, DPT, ATC, LAT    [facility]  O: [phone]  E: [email or web address]

## 2021-09-01 NOTE — DISCHARGE SUMMARY
[facility]  Hospitalist Discharge Summary      Date of Admission:  8/28/2021  Date of Discharge:  9/1/2021  Discharging Provider: Quentin Guzman NP      Discharge Diagnoses   Principal Problem:    HCAP (healthcare-associated pneumonia)  Active Problems:    Urinary retention    Benign prostatic hyperplasia with urinary retention    History of 2019 novel coronavirus disease (COVID-19)      Follow-ups Needed After Discharge   Follow-up Appointments     Follow-up and recommended labs and tests       Follow up with primary care provider, Amirah Laura, as needed.           Unresulted Labs Ordered in the Past 30 Days of this Admission     No orders found from 7/29/2021 to 8/29/2021.      These results will be followed up by N/A    Discharge Disposition   Discharged to home  Condition at discharge: Stable    Hospital Course      Roberto Carlos Arce is a 81 y.o. male with history of intracranial hemorrhage and encephalomalacia, hypertension, BPH, SURENDRA, hypothyroidism and recent COVID-19 infection diagnosed on 8/10/2021 who presented to the ED by EMS 8/28/21 for ongoing weakness and vomiting for several days prior to admission. Patient was initially hospitalized for COVID-19 from August 10-13 and discharged on home care and home oxygen. Patient was found to have bilateral pneumonia and transferred from [place] ER. He is anticoagulated on Xarelto. Patient given IV antibiotics with improvement in symptoms. He was able to transition to oral Omnicef and azithromycin. On the day of discharge, patient denies pain. Denies shortness of breath and is maintaining oxygen saturations above 90% on room air. Denies nausea and is tolerating oral intake. Patient is afebrile and hemodynamically stable.  Patient remains confused and is oriented only to self although mentation is improved from admission and thought to be close to baseline. Patient medically stable for hospital discharge. Patient and family have  made the decision to transition to hospice care at time of discharge. Referral placed to [facility] hospice and home care orders were discontinued. Patient given prescription for Omnicef twice daily to complete course of treatment for pneumonia. He completed course of azithromycin while hospitalized. Recommended patient complete course of Xarelto previously prescribed for COVID 19 VTE prophylaxis. Patient did have some urinary retention while in the hospital and Guzman catheter was placed. He will be discharged home with catheter in place and referral was placed to urology for follow up.     Consultations This Hospital Stay   CARE MANAGEMENT / SOCIAL WORK IP CONSULT  PHYSICAL THERAPY ADULT IP CONSULT  OCCUPATIONAL THERAPY ADULT IP CONSULT  OCCUPATIONAL THERAPY ADULT IP CONSULT    Code Status   No CPR- Do NOT Intubate    Time Spent on this Encounter   I, Quentin Guzman NP, personally saw the patient today and spent greater than 30 minutes discharging this patient.       Quentin Guzman NP  [address] MEDICAL SURGICAL  [address] [place] [address]  Phone: [phone]  ______________________________________________________________________    Physical Exam   Vital Signs: Temp: 98.7  F (37.1  C) Temp src: Oral BP: 107/54 Pulse: 59   Resp: 16 SpO2: 94 % O2 Device: None (Room air)    Weight: 132 lbs 1.59 oz  Constitutional: awake, alert, cooperative, no apparent distress, and appears stated age  Respiratory: No increased work of breathing, good air exchange, clear to auscultation bilaterally, no crackles or wheezing  Cardiovascular: regular rate and rhythm and normal S1 and S2  GI: normal bowel sounds, non-distended and non-tender  Skin: normal skin color, texture, turgor  Musculoskeletal: there is no redness, warmth, or swelling of the joints  Neurologic: Awake, alert, oriented to name, place and time; forgetful       Primary Care Physician   Amirah Laura    Discharge Orders      Adult  Neurology Referral      Adult Urology Referral      Hospice Referral      Reason for your hospital stay    You were in the hospital for pneumonia.     Follow-up and recommended labs and tests     Follow up with primary care provider, Amirah Laura, as needed.     Activity    Your activity upon discharge: activity as tolerated     Tubes and drains    You are going home with the following tubes or drains: cha catheter.  Tube cares per hospital or home care instructions     Diet    Follow this diet upon discharge: Orders Placed This Encounter     Regular Diet       Significant Results and Procedures   Most Recent 3 CBC's:Recent Labs   Lab Test 09/01/21  0632 08/31/21  0812 08/30/21  0548   WBC 5.3 6.1 8.5   HGB 11.1* 10.7* 12.0*   MCV 90 91 90   * 128* 149*     Most Recent 3 BMP's:Recent Labs   Lab Test 09/01/21  0632 08/31/21  0812 08/30/21  0548    139 138   POTASSIUM 3.6 3.9 3.7   CHLORIDE 109 107 108   CO2 27 30 24   BUN 13 14 17   CR 0.70 0.94 0.79   ANIONGAP 3 2* 6   SERENITY 8.7 8.5 8.6   GLC 91 84 89   ,   Results for orders placed or performed during the hospital encounter of 08/28/21   XR Chest Port 1 View    Narrative    EXAM: XR CHEST PORT 1 VIEW  LOCATION: [facility]  DATE/TIME: 8/29/2021 4:20 AM    INDICATION: Pneumonia  COMPARISON: 08/10/2021      Impression    IMPRESSION: Patchy bilateral perihilar and lower lung infiltrates. Findings compatible with bilateral pneumonia, increased since 08/10/2021. Normal heart size and pulmonary vascularity. Tortuous and calcified thoracic aorta. Degenerative changes in the   spine and shoulders.       Discharge Medications   Current Discharge Medication List      START taking these medications    Details   cefdinir (OMNICEF) 300 MG capsule Take 1 capsule (300 mg) by mouth every 12 hours  Qty: 16 capsule, Refills: 0    Associated Diagnoses: HCAP (healthcare-associated pneumonia)      rivaroxaban ANTICOAGULANT (XARELTO  ANTICOAGULANT) 10 MG TABS tablet Take 1 tablet (10 mg) by mouth daily (with dinner)  Qty: 7 tablet, Refills: 0    Associated Diagnoses: History of 2019 novel coronavirus disease (COVID-19)         CONTINUE these medications which have CHANGED    Details   amLODIPine (NORVASC) 5 MG tablet Take 1 tablet (5 mg) by mouth daily    Comments: Hold for pulse less than 60 and systolic blood pressure less than 110      lisinopril (ZESTRIL) 10 MG tablet Take 1 tablet (10 mg) by mouth daily    Comments: Hold for pulse less than 60 and systolic blood pressure less than 110         CONTINUE these medications which have NOT CHANGED    Details   aspirin 325 MG tablet Take 325 mg by mouth daily      Chelated Potassium 99 MG TABS Take 1 tablet by mouth daily      Cholecalciferol (VITAMIN D3) 1000 units CAPS Take 2,000 Units by mouth daily      finasteride (PROSCAR) 5 MG tablet Take 5 mg by mouth daily      levothyroxine (SYNTHROID/LEVOTHROID) 88 MCG tablet Take 88 mcg by mouth daily      Lutein 20 MG CAPS Take 20 mg by mouth 2 times daily       tamsulosin (FLOMAX) 0.4 MG capsule Take 0.4 mg by mouth daily      chlorproMAZINE (THORAZINE) 25 MG tablet Take 25 mg by mouth 3 times daily as needed (Hiccups)       oxybutynin (DITROPAN) 5 MG tablet Take 1 tablet (5 mg) by mouth 3 times daily  Qty: 90 tablet, Refills: 3    Associated Diagnoses: Nocturia      rosuvastatin (CRESTOR) 10 MG tablet Take 10 mg by mouth At Bedtime           Allergies   No Known Allergies

## 2021-09-01 NOTE — PLAN OF CARE
S-(situation): Patient discharged to home via w/c with family.  Escorted to main entrance by RN    B-(background): Covid pneumonia    A-(assessment): remains confused but cooperative.  Denies pain.  Tolerating diet.  Up with assist.    R-(recommendations): Discharge instructions reviewed with pt's family, denies questions.  Hospice consult placed. Listed belongings gathered and returned to patient.          Discharge Nursing Criteria:     Care Plan and Patient education resolved: Yes    New Medications- pt has been educated about purpose and side effects: Yes    Vaccines  Influenza status verified at discharge:  Yes    Intentionally Retained Items (examples: Drains, Wound packing, ureteral stents, cha, PICC line or IV)  Patient was sent home with Cha left in place.   Follow up appointment made for removal: No    MISC  Prescriptions if needed, hard copies sent with patient  NA  Home medications returned to patient: NA  Medication Bin checked and emptied on discharge Yes  Patient reports post-discharge pain management plan is effective: Yes

## 2021-09-02 ENCOUNTER — PATIENT OUTREACH (OUTPATIENT)
Dept: CARE COORDINATION | Facility: CLINIC | Age: 81
End: 2021-09-02

## 2021-09-02 DIAGNOSIS — Z71.89 OTHER SPECIFIED COUNSELING: ICD-10-CM

## 2021-09-02 NOTE — PROGRESS NOTES
Clinic Care Coordination Contact  [facility]/Voicemail       Clinical Data: Care Coordinator Outreach  Reason for referral: TCM outreach  Outreach attempted x 1.  Left message on patient's voicemail with call back information and requested return call.  Plan: Care Coordinator will try to reach patient again in 1-2 business days.       Krystal Chambers  Community Health Worker  [facility]  Ph: [phone]

## 2021-09-03 NOTE — PROGRESS NOTES
Clinic Care Coordination Contact    Background: Care Coordination referral placed from [facility] discharge report for reason of patient meeting criteria for a TCM outreach call by [facility] [place] team.    Assessment: Upon chart review, [facility] Team member will cancel/close the referral for TCM outreach due to reason below:    Patient has been discharged to Hospice Care    Plan: Care Coordination referral for TCM outreach canceled.    Krystal Chambers  Community Health Worker  [facility]  Ph: [phone]

## 2021-10-25 NOTE — PROGRESS NOTES
Roberto Carlos Arce  Gender: male  : 1940  [address]  [place] [address]  [phone] (home)     Medical Record: [id number]  Pharmacy: [id number] - [address]  Primary Care Provider: Amirah Laura    Parent's names are: Data Unavailable (mother) and Data Unavailable (father).      [facility]  2021     Discharge Phone Call:  Key Words/Key Times    Did not attempt, patient confused at baseline and hospice services consulted.  Lucho Kirk RN